# Patient Record
Sex: FEMALE | Race: WHITE | Employment: OTHER | URBAN - METROPOLITAN AREA
[De-identification: names, ages, dates, MRNs, and addresses within clinical notes are randomized per-mention and may not be internally consistent; named-entity substitution may affect disease eponyms.]

---

## 2023-07-10 ENCOUNTER — OFFICE VISIT (OUTPATIENT)
Dept: PRIMARY CARE | Facility: CLINIC | Age: 63
End: 2023-07-10

## 2023-07-10 VITALS
HEIGHT: 64 IN | OXYGEN SATURATION: 97 % | DIASTOLIC BLOOD PRESSURE: 84 MMHG | WEIGHT: 275 LBS | TEMPERATURE: 97.4 F | HEART RATE: 80 BPM | SYSTOLIC BLOOD PRESSURE: 136 MMHG | BODY MASS INDEX: 46.95 KG/M2

## 2023-07-10 DIAGNOSIS — E78.5 HYPERLIPIDEMIA, UNSPECIFIED HYPERLIPIDEMIA TYPE: ICD-10-CM

## 2023-07-10 DIAGNOSIS — I10 BENIGN ESSENTIAL HYPERTENSION: ICD-10-CM

## 2023-07-10 DIAGNOSIS — E11.65 TYPE 2 DIABETES MELLITUS WITH HYPERGLYCEMIA, WITHOUT LONG-TERM CURRENT USE OF INSULIN (MULTI): Primary | ICD-10-CM

## 2023-07-10 DIAGNOSIS — F33.1 DEPRESSION, MAJOR, RECURRENT, MODERATE (MULTI): ICD-10-CM

## 2023-07-10 DIAGNOSIS — J30.9 ALLERGIC RHINITIS, UNSPECIFIED SEASONALITY, UNSPECIFIED TRIGGER: ICD-10-CM

## 2023-07-10 DIAGNOSIS — E66.01 MORBID OBESITY (MULTI): ICD-10-CM

## 2023-07-10 DIAGNOSIS — J45.20 MILD INTERMITTENT ASTHMA WITHOUT COMPLICATION (HHS-HCC): Chronic | ICD-10-CM

## 2023-07-10 DIAGNOSIS — E03.9 HYPOTHYROIDISM, UNSPECIFIED TYPE: ICD-10-CM

## 2023-07-10 PROBLEM — E55.9 VITAMIN D DEFICIENCY: Status: ACTIVE | Noted: 2023-01-20

## 2023-07-10 PROBLEM — E78.00 PURE HYPERCHOLESTEROLEMIA: Status: ACTIVE | Noted: 2022-01-04

## 2023-07-10 PROBLEM — E11.9 TYPE 2 DIABETES MELLITUS (MULTI): Chronic | Status: ACTIVE | Noted: 2022-01-04

## 2023-07-10 PROBLEM — E11.9 TYPE 2 DIABETES MELLITUS (MULTI): Status: ACTIVE | Noted: 2022-01-04

## 2023-07-10 PROCEDURE — 1036F TOBACCO NON-USER: CPT | Performed by: FAMILY MEDICINE

## 2023-07-10 PROCEDURE — 3079F DIAST BP 80-89 MM HG: CPT | Performed by: FAMILY MEDICINE

## 2023-07-10 PROCEDURE — 3044F HG A1C LEVEL LT 7.0%: CPT | Performed by: FAMILY MEDICINE

## 2023-07-10 PROCEDURE — 4010F ACE/ARB THERAPY RXD/TAKEN: CPT | Performed by: FAMILY MEDICINE

## 2023-07-10 PROCEDURE — 99214 OFFICE O/P EST MOD 30 MIN: CPT | Performed by: FAMILY MEDICINE

## 2023-07-10 PROCEDURE — 3075F SYST BP GE 130 - 139MM HG: CPT | Performed by: FAMILY MEDICINE

## 2023-07-10 RX ORDER — GLIPIZIDE 5 MG/1
5 TABLET ORAL 2 TIMES DAILY
COMMUNITY
Start: 2012-12-07 | End: 2023-07-10 | Stop reason: SDUPTHER

## 2023-07-10 RX ORDER — LISINOPRIL 40 MG/1
40 TABLET ORAL DAILY
COMMUNITY
End: 2023-07-10 | Stop reason: SDUPTHER

## 2023-07-10 RX ORDER — LISINOPRIL 40 MG/1
40 TABLET ORAL DAILY
Qty: 90 TABLET | Refills: 1 | Status: SHIPPED | OUTPATIENT
Start: 2023-07-10 | End: 2024-02-19 | Stop reason: SDUPTHER

## 2023-07-10 RX ORDER — BUPROPION HYDROCHLORIDE 150 MG/1
150 TABLET ORAL DAILY
Qty: 90 TABLET | Refills: 1 | Status: SHIPPED | OUTPATIENT
Start: 2023-07-10 | End: 2024-02-19 | Stop reason: SDUPTHER

## 2023-07-10 RX ORDER — LEVOTHYROXINE SODIUM 112 UG/1
112 TABLET ORAL DAILY
Qty: 90 TABLET | Refills: 1 | Status: SHIPPED | OUTPATIENT
Start: 2023-07-10 | End: 2024-02-19 | Stop reason: SDUPTHER

## 2023-07-10 RX ORDER — LEVOTHYROXINE SODIUM 112 UG/1
112 TABLET ORAL DAILY
COMMUNITY
End: 2023-07-10 | Stop reason: SDUPTHER

## 2023-07-10 RX ORDER — METFORMIN HYDROCHLORIDE 1000 MG/1
1000 TABLET ORAL 2 TIMES DAILY
Qty: 180 TABLET | Refills: 1 | Status: SHIPPED | OUTPATIENT
Start: 2023-07-10 | End: 2024-02-19 | Stop reason: SDUPTHER

## 2023-07-10 RX ORDER — ATORVASTATIN CALCIUM 20 MG/1
20 TABLET, FILM COATED ORAL DAILY
COMMUNITY
End: 2023-07-10 | Stop reason: SDUPTHER

## 2023-07-10 RX ORDER — ACETAMINOPHEN, DEXTROMETHORPHAN HBR, DOXYLAMINE SUCCINATE, PHENYLEPHRINE HCL 650; 20; 12.5; 1 MG/30ML; MG/30ML; MG/30ML; MG/30ML
1 SOLUTION ORAL DAILY
COMMUNITY
Start: 2022-09-02

## 2023-07-10 RX ORDER — ATORVASTATIN CALCIUM 20 MG/1
20 TABLET, FILM COATED ORAL DAILY
Qty: 90 TABLET | Refills: 1 | Status: SHIPPED | OUTPATIENT
Start: 2023-07-10 | End: 2024-02-19 | Stop reason: SDUPTHER

## 2023-07-10 RX ORDER — DULAGLUTIDE 1.5 MG/.5ML
1.5 INJECTION, SOLUTION SUBCUTANEOUS
COMMUNITY
Start: 2022-03-28 | End: 2023-07-10

## 2023-07-10 RX ORDER — ALBUTEROL SULFATE 90 UG/1
2 POWDER, METERED RESPIRATORY (INHALATION) DAILY PRN
COMMUNITY
Start: 2023-01-25 | End: 2024-05-21 | Stop reason: SDUPTHER

## 2023-07-10 RX ORDER — FUROSEMIDE 20 MG/1
20 TABLET ORAL DAILY PRN
COMMUNITY

## 2023-07-10 RX ORDER — CETIRIZINE HYDROCHLORIDE 10 MG/1
10 TABLET ORAL DAILY
COMMUNITY

## 2023-07-10 RX ORDER — GLIPIZIDE 5 MG/1
5 TABLET ORAL 2 TIMES DAILY
Qty: 180 TABLET | Refills: 1 | Status: SHIPPED | OUTPATIENT
Start: 2023-07-10 | End: 2024-02-19 | Stop reason: SDUPTHER

## 2023-07-10 RX ORDER — METFORMIN HYDROCHLORIDE 1000 MG/1
1000 TABLET ORAL 2 TIMES DAILY
COMMUNITY
Start: 2012-11-21 | End: 2023-07-10 | Stop reason: SDUPTHER

## 2023-07-10 RX ORDER — BUPROPION HYDROCHLORIDE 150 MG/1
150 TABLET ORAL DAILY
COMMUNITY
End: 2023-07-10 | Stop reason: SDUPTHER

## 2023-07-10 ASSESSMENT — PATIENT HEALTH QUESTIONNAIRE - PHQ9
2. FEELING DOWN, DEPRESSED OR HOPELESS: NOT AT ALL
SUM OF ALL RESPONSES TO PHQ9 QUESTIONS 1 AND 2: 0
1. LITTLE INTEREST OR PLEASURE IN DOING THINGS: NOT AT ALL

## 2023-07-10 ASSESSMENT — ENCOUNTER SYMPTOMS
SHORTNESS OF BREATH: 0
FEVER: 0
NAUSEA: 0
VOMITING: 0
COUGH: 0
ABDOMINAL PAIN: 0
CHILLS: 0
DIARRHEA: 0
DYSURIA: 0

## 2023-07-10 NOTE — PROGRESS NOTES
"Subjective   Patient ID: Dinah Canales is a 63 y.o. female who presents for Diabetes, Hyperlipidemia, Hypertension, and Hypothyroidism.    Dinah presents for follow-up of chronic conditions.  She has been feeling more stressed recently.  Feels that the bupropion is working well most days.    Is off Trulicity due to insurance issues.  Recently went on a cruise, stenosed her blood sugars are probably higher than usual.  Did have lab work done.  Is here to review.         Review of Systems   Constitutional:  Negative for chills and fever.   Respiratory:  Negative for cough and shortness of breath.    Cardiovascular:  Negative for chest pain.   Gastrointestinal:  Negative for abdominal pain, diarrhea, nausea and vomiting.   Genitourinary:  Negative for dysuria.       Objective   /84   Pulse 80   Temp 36.3 °C (97.4 °F)   Ht 1.613 m (5' 3.5\")   Wt 125 kg (275 lb)   SpO2 97%   BMI 47.95 kg/m²     Physical Exam  Constitutional:       General: She is not in acute distress.     Appearance: Normal appearance.   HENT:      Head: Normocephalic.      Mouth/Throat:      Mouth: Mucous membranes are moist.   Eyes:      Extraocular Movements: Extraocular movements intact.      Conjunctiva/sclera: Conjunctivae normal.   Cardiovascular:      Rate and Rhythm: Normal rate and regular rhythm.      Heart sounds: No murmur heard.  Pulmonary:      Breath sounds: No wheezing or rhonchi.   Musculoskeletal:      Cervical back: Neck supple.   Skin:     General: Skin is warm and dry.   Neurological:      Mental Status: She is alert.   Psychiatric:         Mood and Affect: Mood normal.         Behavior: Behavior normal.         Thought Content: Thought content normal.         Judgment: Judgment normal.         Assessment/Plan   Problem List Items Addressed This Visit       Allergic rhinitis     Continue OTC allergy medication.         Asthma (Chronic)     Continue albuterol as needed.         Benign essential hypertension (Chronic)     " Controlled.  Continue lisinopril 40 mg.         Relevant Medications    lisinopril 40 mg tablet    Other Relevant Orders    Comprehensive Metabolic Panel    Hyperlipidemia (Chronic)     Recheck lipid panel with next labs.  Continue atorvastatin 20 mg.         Relevant Medications    atorvastatin (Lipitor) 20 mg tablet    Other Relevant Orders    Lipid Panel    Comprehensive Metabolic Panel    Hypothyroidism (Chronic)     TSH at goal.  Continue levothyroxine 112 mcg.         Relevant Medications    levothyroxine (Synthroid, Levoxyl) 112 mcg tablet    Other Relevant Orders    TSH with reflex to Free T4 if abnormal    Morbid obesity (CMS/[facility])     Continue to work on lifestyle modification.         Type 2 diabetes mellitus (CMS/HCC) - Primary (Chronic)     Hemoglobin A1c increased from 6.6% to 7.1% while off Trulicity.  Plan to restart Trulicity once the patient is on Medicare in January.  Continue metformin and glipizide at this time.         Relevant Medications    glipiZIDE (Glucotrol) 5 mg tablet    metFORMIN (Glucophage) 1,000 mg tablet    Other Relevant Orders    Hemoglobin A1C     Other Visit Diagnoses       Depression, major, recurrent, moderate (CMS/HCC)        Relevant Medications    buPROPion XL (Wellbutrin XL) 150 mg 24 hr tablet

## 2023-07-10 NOTE — ASSESSMENT & PLAN NOTE
Hemoglobin A1c increased from 6.6% to 7.1% while off Trulicity.  Plan to restart Trulicity once the patient is on Medicare in January.  Continue metformin and glipizide at this time.

## 2024-02-16 ENCOUNTER — LAB (OUTPATIENT)
Dept: LAB | Facility: LAB | Age: 64
End: 2024-02-16
Payer: MEDICARE

## 2024-02-16 DIAGNOSIS — E78.5 HYPERLIPIDEMIA, UNSPECIFIED HYPERLIPIDEMIA TYPE: ICD-10-CM

## 2024-02-16 DIAGNOSIS — E03.9 HYPOTHYROIDISM, UNSPECIFIED TYPE: ICD-10-CM

## 2024-02-16 DIAGNOSIS — E11.65 TYPE 2 DIABETES MELLITUS WITH HYPERGLYCEMIA, WITHOUT LONG-TERM CURRENT USE OF INSULIN (MULTI): ICD-10-CM

## 2024-02-16 DIAGNOSIS — I10 BENIGN ESSENTIAL HYPERTENSION: ICD-10-CM

## 2024-02-16 LAB
ALBUMIN SERPL BCP-MCNC: 3.7 G/DL (ref 3.4–5)
ALP SERPL-CCNC: 69 U/L (ref 33–136)
ALT SERPL W P-5'-P-CCNC: 23 U/L (ref 7–45)
ANION GAP SERPL CALC-SCNC: 12 MMOL/L (ref 10–20)
AST SERPL W P-5'-P-CCNC: 19 U/L (ref 9–39)
BILIRUB SERPL-MCNC: 0.3 MG/DL (ref 0–1.2)
BUN SERPL-MCNC: 14 MG/DL (ref 6–23)
CALCIUM SERPL-MCNC: 9.2 MG/DL (ref 8.6–10.3)
CHLORIDE SERPL-SCNC: 104 MMOL/L (ref 98–107)
CHOLEST SERPL-MCNC: 157 MG/DL (ref 0–199)
CHOLESTEROL/HDL RATIO: 3.7
CO2 SERPL-SCNC: 29 MMOL/L (ref 21–32)
CREAT SERPL-MCNC: 0.81 MG/DL (ref 0.5–1.05)
EGFRCR SERPLBLD CKD-EPI 2021: 81 ML/MIN/1.73M*2
EST. AVERAGE GLUCOSE BLD GHB EST-MCNC: 154 MG/DL
GLUCOSE SERPL-MCNC: 93 MG/DL (ref 74–99)
HBA1C MFR BLD: 7 %
HDLC SERPL-MCNC: 42.2 MG/DL
LDLC SERPL CALC-MCNC: 78 MG/DL
NON HDL CHOLESTEROL: 115 MG/DL (ref 0–149)
POTASSIUM SERPL-SCNC: 4.6 MMOL/L (ref 3.5–5.3)
PROT SERPL-MCNC: 6.8 G/DL (ref 6.4–8.2)
SODIUM SERPL-SCNC: 140 MMOL/L (ref 136–145)
T4 FREE SERPL-MCNC: 0.91 NG/DL (ref 0.61–1.12)
TRIGL SERPL-MCNC: 185 MG/DL (ref 0–149)
TSH SERPL-ACNC: 4.03 MIU/L (ref 0.44–3.98)
VLDL: 37 MG/DL (ref 0–40)

## 2024-02-16 PROCEDURE — 84439 ASSAY OF FREE THYROXINE: CPT

## 2024-02-16 PROCEDURE — 80053 COMPREHEN METABOLIC PANEL: CPT

## 2024-02-16 PROCEDURE — 83036 HEMOGLOBIN GLYCOSYLATED A1C: CPT

## 2024-02-16 PROCEDURE — 84443 ASSAY THYROID STIM HORMONE: CPT

## 2024-02-16 PROCEDURE — 80061 LIPID PANEL: CPT

## 2024-02-16 PROCEDURE — 36415 COLL VENOUS BLD VENIPUNCTURE: CPT

## 2024-02-19 ENCOUNTER — OFFICE VISIT (OUTPATIENT)
Dept: PRIMARY CARE | Facility: CLINIC | Age: 64
End: 2024-02-19
Payer: MEDICARE

## 2024-02-19 VITALS
WEIGHT: 274 LBS | OXYGEN SATURATION: 96 % | BODY MASS INDEX: 47.78 KG/M2 | HEART RATE: 70 BPM | TEMPERATURE: 97.3 F | SYSTOLIC BLOOD PRESSURE: 136 MMHG | DIASTOLIC BLOOD PRESSURE: 82 MMHG

## 2024-02-19 DIAGNOSIS — E03.9 HYPOTHYROIDISM, UNSPECIFIED TYPE: ICD-10-CM

## 2024-02-19 DIAGNOSIS — E11.65 TYPE 2 DIABETES MELLITUS WITH HYPERGLYCEMIA, WITHOUT LONG-TERM CURRENT USE OF INSULIN (MULTI): ICD-10-CM

## 2024-02-19 DIAGNOSIS — J30.9 ALLERGIC RHINITIS, UNSPECIFIED SEASONALITY, UNSPECIFIED TRIGGER: ICD-10-CM

## 2024-02-19 DIAGNOSIS — I10 BENIGN ESSENTIAL HYPERTENSION: ICD-10-CM

## 2024-02-19 DIAGNOSIS — S39.012A LOW BACK STRAIN, INITIAL ENCOUNTER: ICD-10-CM

## 2024-02-19 DIAGNOSIS — J32.9 RECURRENT SINUS INFECTIONS: ICD-10-CM

## 2024-02-19 DIAGNOSIS — Z12.11 SCREEN FOR COLON CANCER: ICD-10-CM

## 2024-02-19 DIAGNOSIS — F33.1 DEPRESSION, MAJOR, RECURRENT, MODERATE (MULTI): ICD-10-CM

## 2024-02-19 DIAGNOSIS — Z23 NEED FOR PNEUMOCOCCAL VACCINE: ICD-10-CM

## 2024-02-19 DIAGNOSIS — E78.5 HYPERLIPIDEMIA, UNSPECIFIED HYPERLIPIDEMIA TYPE: ICD-10-CM

## 2024-02-19 DIAGNOSIS — Z11.59 NEED FOR HEPATITIS C SCREENING TEST: ICD-10-CM

## 2024-02-19 DIAGNOSIS — E66.01 MORBID OBESITY (MULTI): ICD-10-CM

## 2024-02-19 DIAGNOSIS — J45.20 MILD INTERMITTENT ASTHMA WITHOUT COMPLICATION (HHS-HCC): Chronic | ICD-10-CM

## 2024-02-19 DIAGNOSIS — Z11.4 SCREENING FOR HUMAN IMMUNODEFICIENCY VIRUS: ICD-10-CM

## 2024-02-19 DIAGNOSIS — E55.9 VITAMIN D DEFICIENCY: ICD-10-CM

## 2024-02-19 DIAGNOSIS — Z12.31 ENCOUNTER FOR SCREENING MAMMOGRAM FOR MALIGNANT NEOPLASM OF BREAST: Primary | ICD-10-CM

## 2024-02-19 PROCEDURE — 3075F SYST BP GE 130 - 139MM HG: CPT | Performed by: FAMILY MEDICINE

## 2024-02-19 PROCEDURE — 3079F DIAST BP 80-89 MM HG: CPT | Performed by: FAMILY MEDICINE

## 2024-02-19 PROCEDURE — 99214 OFFICE O/P EST MOD 30 MIN: CPT | Performed by: FAMILY MEDICINE

## 2024-02-19 PROCEDURE — 3048F LDL-C <100 MG/DL: CPT | Performed by: FAMILY MEDICINE

## 2024-02-19 PROCEDURE — 3051F HG A1C>EQUAL 7.0%<8.0%: CPT | Performed by: FAMILY MEDICINE

## 2024-02-19 PROCEDURE — G0009 ADMIN PNEUMOCOCCAL VACCINE: HCPCS | Performed by: FAMILY MEDICINE

## 2024-02-19 PROCEDURE — 1036F TOBACCO NON-USER: CPT | Performed by: FAMILY MEDICINE

## 2024-02-19 PROCEDURE — 90677 PCV20 VACCINE IM: CPT | Performed by: FAMILY MEDICINE

## 2024-02-19 PROCEDURE — 4010F ACE/ARB THERAPY RXD/TAKEN: CPT | Performed by: FAMILY MEDICINE

## 2024-02-19 RX ORDER — METFORMIN HYDROCHLORIDE 1000 MG/1
1000 TABLET ORAL 2 TIMES DAILY
Qty: 180 TABLET | Refills: 1 | Status: SHIPPED | OUTPATIENT
Start: 2024-02-19 | End: 2024-05-21 | Stop reason: SDUPTHER

## 2024-02-19 RX ORDER — GLIPIZIDE 5 MG/1
5 TABLET ORAL 2 TIMES DAILY
Qty: 180 TABLET | Refills: 1 | Status: SHIPPED | OUTPATIENT
Start: 2024-02-19 | End: 2024-05-21 | Stop reason: ALTCHOICE

## 2024-02-19 RX ORDER — BUPROPION HYDROCHLORIDE 150 MG/1
150 TABLET ORAL DAILY
Qty: 90 TABLET | Refills: 1 | Status: SHIPPED | OUTPATIENT
Start: 2024-02-19 | End: 2024-05-21 | Stop reason: SDUPTHER

## 2024-02-19 RX ORDER — LEVOTHYROXINE SODIUM 112 UG/1
TABLET ORAL
Qty: 120 TABLET | Refills: 1 | Status: SHIPPED | OUTPATIENT
Start: 2024-02-19 | End: 2024-05-21 | Stop reason: SDUPTHER

## 2024-02-19 RX ORDER — DOXYCYCLINE 100 MG/1
100 CAPSULE ORAL 2 TIMES DAILY
Qty: 20 CAPSULE | Refills: 0 | Status: SHIPPED | OUTPATIENT
Start: 2024-02-19 | End: 2024-02-29

## 2024-02-19 RX ORDER — DULAGLUTIDE 0.75 MG/.5ML
0.75 INJECTION, SOLUTION SUBCUTANEOUS
Qty: 6 ML | Refills: 1 | Status: SHIPPED | OUTPATIENT
Start: 2024-02-19 | End: 2024-05-01

## 2024-02-19 RX ORDER — CYCLOBENZAPRINE HCL 5 MG
5 TABLET ORAL NIGHTLY PRN
Qty: 10 TABLET | Refills: 0 | Status: SHIPPED | OUTPATIENT
Start: 2024-02-19 | End: 2024-04-19

## 2024-02-19 RX ORDER — LISINOPRIL 40 MG/1
40 TABLET ORAL DAILY
Qty: 90 TABLET | Refills: 1 | Status: SHIPPED | OUTPATIENT
Start: 2024-02-19 | End: 2024-05-21 | Stop reason: SDUPTHER

## 2024-02-19 RX ORDER — ATORVASTATIN CALCIUM 20 MG/1
20 TABLET, FILM COATED ORAL DAILY
Qty: 90 TABLET | Refills: 1 | Status: SHIPPED | OUTPATIENT
Start: 2024-02-19 | End: 2024-05-21 | Stop reason: SDUPTHER

## 2024-02-19 ASSESSMENT — ENCOUNTER SYMPTOMS
DIARRHEA: 0
SHORTNESS OF BREATH: 0
COUGH: 0
VOMITING: 0
NAUSEA: 0
ABDOMINAL PAIN: 0

## 2024-02-19 NOTE — PROGRESS NOTES
Subjective   Patient ID: Dinah Canales is a 63 y.o. female who presents for Hyperlipidemia, Hypertension, Hypothyroidism (Recheck /Review bw), and Diabetes.    Dinah presents for follow-up. She has been feeling well. Working on lifestyle changes. Having trouble losing weight. Would like to get started on Trulicity again.     Having pressure in sinuses. Has been present for 1-2 months.    Low back hurting after hanging wallpaper. Feels like muscle pulled in low back on both sides. Using heat and massage; having some improvement.          Review of Systems   Respiratory:  Negative for cough and shortness of breath.    Cardiovascular:  Negative for chest pain.   Gastrointestinal:  Negative for abdominal pain, diarrhea, nausea and vomiting.       Objective   /82   Pulse 70   Temp 36.3 °C (97.3 °F)   Wt 124 kg (274 lb)   SpO2 96%   BMI 47.78 kg/m²     Physical Exam  Constitutional:       General: She is not in acute distress.     Appearance: Normal appearance.   HENT:      Head: Normocephalic.      Mouth/Throat:      Mouth: Mucous membranes are moist.   Eyes:      Extraocular Movements: Extraocular movements intact.      Conjunctiva/sclera: Conjunctivae normal.   Cardiovascular:      Rate and Rhythm: Normal rate and regular rhythm.      Heart sounds: No murmur heard.  Pulmonary:      Breath sounds: No wheezing or rhonchi.   Musculoskeletal:      Cervical back: Neck supple.      Lumbar back: Tenderness present. No edema or bony tenderness. Normal range of motion.   Skin:     General: Skin is warm and dry.   Neurological:      Mental Status: She is alert.   Psychiatric:         Mood and Affect: Mood normal.         Behavior: Behavior normal.         Assessment/Plan   Problem List Items Addressed This Visit             ICD-10-CM    Allergic rhinitis J30.9    Asthma (Chronic) J45.909     Continue albuterol as needed.          Benign essential hypertension (Chronic) I10    Relevant Medications    lisinopril 40 mg  tablet    Other Relevant Orders    Comprehensive Metabolic Panel    Hyperlipidemia (Chronic) E78.5     LDL at goal.          Relevant Medications    atorvastatin (Lipitor) 20 mg tablet    Other Relevant Orders    Lipid Panel    Hypothyroidism (Chronic) E03.9     TSH mildly elevated. Increase to 2 tabs of levothyroxine on Sundays. Recheck TSH in 3 months.          Relevant Medications    levothyroxine (Synthroid, Levoxyl) 112 mcg tablet    Other Relevant Orders    TSH with reflex to Free T4 if abnormal    Morbid obesity (CMS/HCC) E66.01     Continue lifestyle modification.          Type 2 diabetes mellitus (CMS/HCC) (Chronic) E11.9     HgbA1c 7.0%. Start Trulicity.          Relevant Medications    glipiZIDE (Glucotrol) 5 mg tablet    metFORMIN (Glucophage) 1,000 mg tablet    dulaglutide (Trulicity) 0.75 mg/0.5 mL pen injector    Other Relevant Orders    Hemoglobin A1C     Other Visit Diagnoses         Codes    Encounter for screening mammogram for malignant neoplasm of breast    -  Primary Z12.31    Relevant Orders    BI mammo bilateral screening tomosynthesis    Depression, major, recurrent, moderate (CMS/HCC)     F33.1    Relevant Medications    buPROPion XL (Wellbutrin XL) 150 mg 24 hr tablet    Recurrent sinus infections     J32.9    Relevant Medications    doxycycline (Vibramycin) 100 mg capsule    Low back strain, initial encounter     S39.012A    Relevant Medications    cyclobenzaprine (Flexeril) 5 mg tablet    Screen for colon cancer     Z12.11    Relevant Orders    Colonoscopy Screening; Average Risk Patient    Need for hepatitis C screening test     Z11.59    Relevant Orders    Hepatitis C Antibody    Screening for human immunodeficiency virus     Z11.4    Relevant Orders    HIV 1/2 Antigen/Antibody Screen with Reflex to Confirmation    Vitamin D deficiency     E55.9    Relevant Orders    Vitamin D 25-Hydroxy,Total (for eval of Vitamin D levels)    Need for pneumococcal vaccine     Z23    Relevant Orders     Pneumococcal conjugate vaccine, 20-valent (PREVNAR 20) (Completed)

## 2024-03-04 ENCOUNTER — HOSPITAL ENCOUNTER (OUTPATIENT)
Dept: RADIOLOGY | Facility: CLINIC | Age: 64
Discharge: HOME | End: 2024-03-04
Payer: MEDICARE

## 2024-03-04 DIAGNOSIS — Z12.31 ENCOUNTER FOR SCREENING MAMMOGRAM FOR MALIGNANT NEOPLASM OF BREAST: ICD-10-CM

## 2024-03-04 PROCEDURE — 77063 BREAST TOMOSYNTHESIS BI: CPT | Performed by: RADIOLOGY

## 2024-03-04 PROCEDURE — 77063 BREAST TOMOSYNTHESIS BI: CPT

## 2024-03-04 PROCEDURE — 77067 SCR MAMMO BI INCL CAD: CPT | Performed by: RADIOLOGY

## 2024-04-23 ENCOUNTER — HOSPITAL ENCOUNTER (OUTPATIENT)
Dept: CARDIOLOGY | Facility: HOSPITAL | Age: 64
Discharge: HOME | End: 2024-04-23
Payer: MEDICARE

## 2024-04-23 ENCOUNTER — PRE-ADMISSION TESTING (OUTPATIENT)
Dept: PREADMISSION TESTING | Facility: HOSPITAL | Age: 64
End: 2024-04-23
Payer: MEDICARE

## 2024-04-23 ENCOUNTER — ANESTHESIA EVENT (OUTPATIENT)
Dept: OPERATING ROOM | Facility: HOSPITAL | Age: 64
End: 2024-04-23
Payer: MEDICARE

## 2024-04-23 VITALS
HEART RATE: 60 BPM | OXYGEN SATURATION: 92 % | RESPIRATION RATE: 22 BRPM | SYSTOLIC BLOOD PRESSURE: 126 MMHG | WEIGHT: 263 LBS | DIASTOLIC BLOOD PRESSURE: 82 MMHG | TEMPERATURE: 98.8 F | HEIGHT: 63 IN | BODY MASS INDEX: 46.6 KG/M2

## 2024-04-23 DIAGNOSIS — I10 BENIGN ESSENTIAL HYPERTENSION: Chronic | ICD-10-CM

## 2024-04-23 DIAGNOSIS — Z01.818 PRE-OP EVALUATION: ICD-10-CM

## 2024-04-23 DIAGNOSIS — E11.65 TYPE 2 DIABETES MELLITUS WITH HYPERGLYCEMIA, WITHOUT LONG-TERM CURRENT USE OF INSULIN (MULTI): Chronic | ICD-10-CM

## 2024-04-23 DIAGNOSIS — Z12.11 SCREEN FOR COLON CANCER: ICD-10-CM

## 2024-04-23 DIAGNOSIS — E66.01 MORBID OBESITY (MULTI): ICD-10-CM

## 2024-04-23 DIAGNOSIS — Z01.818 PRE-OP EVALUATION: Primary | ICD-10-CM

## 2024-04-23 DIAGNOSIS — J45.20 MILD INTERMITTENT ASTHMA WITHOUT COMPLICATION (HHS-HCC): Chronic | ICD-10-CM

## 2024-04-23 DIAGNOSIS — E78.5 HYPERLIPIDEMIA, UNSPECIFIED HYPERLIPIDEMIA TYPE: Chronic | ICD-10-CM

## 2024-04-23 LAB
ANION GAP SERPL CALC-SCNC: 14 MMOL/L (ref 10–20)
ATRIAL RATE: 57 BPM
BUN SERPL-MCNC: 20 MG/DL (ref 6–23)
CALCIUM SERPL-MCNC: 9.2 MG/DL (ref 8.6–10.3)
CHLORIDE SERPL-SCNC: 107 MMOL/L (ref 98–107)
CO2 SERPL-SCNC: 24 MMOL/L (ref 21–32)
CREAT SERPL-MCNC: 0.86 MG/DL (ref 0.5–1.05)
EGFRCR SERPLBLD CKD-EPI 2021: 75 ML/MIN/1.73M*2
ERYTHROCYTE [DISTWIDTH] IN BLOOD BY AUTOMATED COUNT: 14.2 % (ref 11.5–14.5)
GLUCOSE SERPL-MCNC: 76 MG/DL (ref 74–99)
HCT VFR BLD AUTO: 42.4 % (ref 36–46)
HGB BLD-MCNC: 13.3 G/DL (ref 12–16)
MCH RBC QN AUTO: 28.2 PG (ref 26–34)
MCHC RBC AUTO-ENTMCNC: 31.4 G/DL (ref 32–36)
MCV RBC AUTO: 90 FL (ref 80–100)
NRBC BLD-RTO: 0 /100 WBCS (ref 0–0)
P AXIS: 48 DEGREES
PLATELET # BLD AUTO: 261 X10*3/UL (ref 150–450)
POTASSIUM SERPL-SCNC: 5 MMOL/L (ref 3.5–5.3)
PR INTERVAL: 148 MS
Q ONSET: 253 MS
QRS COUNT: 9 BEATS
QRS DURATION: 98 MS
QT INTERVAL: 400 MS
QTC CALCULATION(BAZETT): 387 MS
QTC FREDERICIA: 391 MS
R AXIS: 58 DEGREES
RBC # BLD AUTO: 4.72 X10*6/UL (ref 4–5.2)
SODIUM SERPL-SCNC: 140 MMOL/L (ref 136–145)
T AXIS: -2 DEGREES
T OFFSET: 453 MS
VENTRICULAR RATE: 56 BPM
WBC # BLD AUTO: 9 X10*3/UL (ref 4.4–11.3)

## 2024-04-23 PROCEDURE — 93005 ELECTROCARDIOGRAM TRACING: CPT

## 2024-04-23 PROCEDURE — 99203 OFFICE O/P NEW LOW 30 MIN: CPT | Performed by: CLINICAL NURSE SPECIALIST

## 2024-04-23 PROCEDURE — 85027 COMPLETE CBC AUTOMATED: CPT

## 2024-04-23 PROCEDURE — 82374 ASSAY BLOOD CARBON DIOXIDE: CPT

## 2024-04-23 PROCEDURE — 36415 COLL VENOUS BLD VENIPUNCTURE: CPT

## 2024-04-23 PROCEDURE — 93010 ELECTROCARDIOGRAM REPORT: CPT | Performed by: STUDENT IN AN ORGANIZED HEALTH CARE EDUCATION/TRAINING PROGRAM

## 2024-04-23 RX ORDER — FAMOTIDINE 10 MG/ML
20 INJECTION INTRAVENOUS ONCE
Status: CANCELLED | OUTPATIENT
Start: 2024-04-23 | End: 2024-04-23

## 2024-04-23 RX ORDER — SODIUM CHLORIDE, SODIUM LACTATE, POTASSIUM CHLORIDE, CALCIUM CHLORIDE 600; 310; 30; 20 MG/100ML; MG/100ML; MG/100ML; MG/100ML
100 INJECTION, SOLUTION INTRAVENOUS CONTINUOUS
Status: CANCELLED | OUTPATIENT
Start: 2024-04-23

## 2024-04-23 RX ORDER — OXYCODONE HYDROCHLORIDE 5 MG/1
5 TABLET ORAL EVERY 4 HOURS PRN
Status: CANCELLED | OUTPATIENT
Start: 2024-04-23

## 2024-04-23 RX ORDER — MORPHINE SULFATE 2 MG/ML
1 INJECTION, SOLUTION INTRAMUSCULAR; INTRAVENOUS EVERY 5 MIN PRN
Status: CANCELLED | OUTPATIENT
Start: 2024-04-23

## 2024-04-23 RX ORDER — ONDANSETRON HYDROCHLORIDE 2 MG/ML
4 INJECTION, SOLUTION INTRAVENOUS ONCE AS NEEDED
Status: CANCELLED | OUTPATIENT
Start: 2024-04-23

## 2024-04-23 RX ORDER — LIDOCAINE HYDROCHLORIDE 10 MG/ML
0.1 INJECTION, SOLUTION EPIDURAL; INFILTRATION; INTRACAUDAL; PERINEURAL ONCE
Status: CANCELLED | OUTPATIENT
Start: 2024-04-23 | End: 2024-04-23

## 2024-04-23 ASSESSMENT — LIFESTYLE VARIABLES: SMOKING_STATUS: NONSMOKER

## 2024-04-23 ASSESSMENT — CHADS2 SCORE
CHADS2 SCORE: 2
HYPERTENSION: YES
AGE GREATER THAN OR EQUAL TO 75: NO
CHF: NO
PRIOR STROKE OR TIA OR THROMBOEMBOLISM: NO
DIABETES: YES

## 2024-04-23 NOTE — CPM/PAT H&P
CPM/PAT Evaluation       Name: Dinah Canales (Dinah Canales)  /Age: 1960/64 y.o.     In-Person       Chief Complaint: Scheduled for colonoscopy under MAC anesthesia per Dr. Briones on 24.       Past Medical History:   Diagnosis Date    Glaucoma     marleen stents       Past Surgical History:   Procedure Laterality Date    CATARACT EXTRACTION      HEMORRHOID SURGERY      KNEE SURGERY         Patient  has no history on file for sexual activity.    Family History   Problem Relation Name Age of Onset    Ovarian cancer Mother      Other (mouth cancer) Father      Lupus Sister      Alcohol abuse Other         Allergies   Allergen Reactions    Penicillins Other, Hives and Palpitations     Anaphylactic reaction    Ciprofloxacin Other     Severe  shoulder pain       Prior to Admission medications    Medication Sig Start Date End Date Taking? Authorizing Provider   albuterol (ProAir RespiClick) 90 mcg/actuation aerosol powdr breath activated inhaler Inhale 2 puffs once daily as needed. 23   Historical Provider, MD   atorvastatin (Lipitor) 20 mg tablet Take 1 tablet (20 mg) by mouth once daily. 24   Cecelia La,    buPROPion XL (Wellbutrin XL) 150 mg 24 hr tablet Take 1 tablet (150 mg) by mouth once daily. 24   Cecelia La,    cetirizine (ZyrTEC) 10 mg tablet Take 1 tablet (10 mg) by mouth once daily.    Historical Provider, MD   cyanocobalamin, vitamin B-12, (Vitamin B-12) 1,000 mcg tablet extended release Take 1,000 mcg by mouth once daily. 22   Historical Provider, MD   cyclobenzaprine (Flexeril) 5 mg tablet Take 1 tablet (5 mg) by mouth as needed at bedtime for muscle spasms. 24  Cecelia La DO   dulaglutide (Trulicity) 0.75 mg/0.5 mL pen injector Inject 0.75 mg under the skin 1 (one) time per week.  Patient taking differently: Inject 0.75 mg under the skin 1 (one) time per week. 24   Cecelia La DO   furosemide (Lasix) 20 mg  tablet Take 1 tablet (20 mg) by mouth once daily as needed. FOR LEG SWELLING    Historical Provider, MD   glipiZIDE (Glucotrol) 5 mg tablet Take 1 tablet (5 mg) by mouth 2 times a day. 2/19/24   Cecelia La DO   levothyroxine (Synthroid, Levoxyl) 112 mcg tablet Take 1 tab PO daily except 2 tabs every Sunday.  Patient taking differently: Take 1 tab PO daily except 2 tabs every Saturday 2/19/24   Cecelia La DO   lisinopril 40 mg tablet Take 1 tablet (40 mg) by mouth once daily. 2/19/24   Cecelia La DO   metFORMIN (Glucophage) 1,000 mg tablet Take 1 tablet (1,000 mg) by mouth 2 times a day. 2/19/24   Cecelia La DO            Visit Vitals  /82   Pulse 60   Temp 37.1 °C (98.8 °F) (Oral)   Resp 22       DASI Risk Score    No data to display       Caprini DVT Assessment      Flowsheet Row Most Recent Value   DVT Score 6   Current Status Minor surgery planned   Age 60-75 years   BMI 41-50 (Morbid obesity)          Modified Frailty Index    No data to display       CHADS2 Stroke Risk  Current as of 17 minutes ago        N/A 3 to 100%: High Risk   2 to < 3%: Medium Risk   0 to < 2%: Low Risk     Last Change: N/A          This score determines the patient's risk of having a stroke if the patient has atrial fibrillation.        This score is not applicable to this patient. Components are not calculated.          Revised Cardiac Risk Index      Flowsheet Row Most Recent Value   Revised Cardiac Risk Calculator 0          Apfel Simplified Score      Flowsheet Row Most Recent Value   Apfel Simplified Score Calculator 2          Risk Analysis Index Results This Encounter    No data found in the last 1 encounters.       Stop Bang Score      Flowsheet Row Most Recent Value   Do you snore loudly? 0   Do you often feel tired or fatigued after your sleep? 0   Has anyone ever observed you stop breathing in your sleep? 0   Do you have or are you being treated for high blood pressure? 1    Recent BMI (Calculated) 47.9   Is BMI greater than 35 kg/m2? 1=Yes   Age older than 50 years old? 1=Yes   Is your neck circumference greater than 17 inches (Male) or 16 inches (Female)? 1   Gender - Male 0=No   STOP-BANG Total Score 4            Assessment and Plan:     Gastrointestinal:  Scheduled for colonoscopy under MAC anesthesia per Dr. Briones on 5/2/24.   CBC, BMP completed. Results pending.   EKG ordered, pending.  H&P dated 4/11/24 per Dr. Briones.  All colonoscopy instructions reviewed with patient by RN. Patient verbalized understanding.

## 2024-04-23 NOTE — PREPROCEDURE INSTRUCTIONS
Medication List            Accurate as of April 23, 2024 10:01 AM. Always use your most recent med list.                atorvastatin 20 mg tablet  Commonly known as: Lipitor  Take 1 tablet (20 mg) by mouth once daily.     buPROPion  mg 24 hr tablet  Commonly known as: Wellbutrin XL  Take 1 tablet (150 mg) by mouth once daily.     cetirizine 10 mg tablet  Commonly known as: ZyrTEC     cyanocobalamin (vitamin B-12) 1,000 mcg tablet extended release  Commonly known as: Vitamin B-12     furosemide 20 mg tablet  Commonly known as: Lasix     glipiZIDE 5 mg tablet  Commonly known as: Glucotrol  Take 1 tablet (5 mg) by mouth 2 times a day.     levothyroxine 112 mcg tablet  Commonly known as: Synthroid, Levoxyl  Take 1 tab PO daily except 2 tabs every Sunday.     lisinopril 40 mg tablet  Take 1 tablet (40 mg) by mouth once daily.     metFORMIN 1,000 mg tablet  Commonly known as: Glucophage  Take 1 tablet (1,000 mg) by mouth 2 times a day.     ProAir RespiClick 90 mcg/actuation aerosol powdr breath activated inhaler  Generic drug: albuterol     Trulicity 0.75 mg/0.5 mL pen injector  Generic drug: dulaglutide  Inject 0.75 mg under the skin 1 (one) time per week.                              NPO Instructions:    Last dose trulicity 4/20  Clear liquids with bowel prep on 5/1  No medications morning of   Do not take metformin night before procedure    Additional Instructions:     Wear  comfortable loose fitting clothing  Do not use moisturizers, creams, lotions or perfume  All jewelry and valuables should be left at home    Shower morning of deodorant only

## 2024-04-25 ENCOUNTER — PATIENT MESSAGE (OUTPATIENT)
Dept: PRIMARY CARE | Facility: CLINIC | Age: 64
End: 2024-04-25
Payer: MEDICARE

## 2024-04-25 DIAGNOSIS — E11.65 TYPE 2 DIABETES MELLITUS WITH HYPERGLYCEMIA, WITHOUT LONG-TERM CURRENT USE OF INSULIN (MULTI): Primary | Chronic | ICD-10-CM

## 2024-04-30 ENCOUNTER — OFFICE VISIT (OUTPATIENT)
Dept: PRIMARY CARE | Facility: CLINIC | Age: 64
End: 2024-04-30
Payer: MEDICARE

## 2024-04-30 VITALS
OXYGEN SATURATION: 97 % | SYSTOLIC BLOOD PRESSURE: 132 MMHG | TEMPERATURE: 97.7 F | DIASTOLIC BLOOD PRESSURE: 90 MMHG | HEART RATE: 61 BPM

## 2024-04-30 DIAGNOSIS — J01.90 ACUTE SINUSITIS, RECURRENCE NOT SPECIFIED, UNSPECIFIED LOCATION: Primary | ICD-10-CM

## 2024-04-30 PROBLEM — F33.1 MODERATE EPISODE OF RECURRENT MAJOR DEPRESSIVE DISORDER (MULTI): Status: ACTIVE | Noted: 2024-04-30

## 2024-04-30 PROBLEM — E55.9 VITAMIN D DEFICIENCY: Status: ACTIVE | Noted: 2024-04-30

## 2024-04-30 PROCEDURE — 99214 OFFICE O/P EST MOD 30 MIN: CPT | Performed by: PHYSICIAN ASSISTANT

## 2024-04-30 PROCEDURE — 1036F TOBACCO NON-USER: CPT | Performed by: PHYSICIAN ASSISTANT

## 2024-04-30 PROCEDURE — 4010F ACE/ARB THERAPY RXD/TAKEN: CPT | Performed by: PHYSICIAN ASSISTANT

## 2024-04-30 PROCEDURE — 1160F RVW MEDS BY RX/DR IN RCRD: CPT | Performed by: PHYSICIAN ASSISTANT

## 2024-04-30 PROCEDURE — 3075F SYST BP GE 130 - 139MM HG: CPT | Performed by: PHYSICIAN ASSISTANT

## 2024-04-30 PROCEDURE — 3048F LDL-C <100 MG/DL: CPT | Performed by: PHYSICIAN ASSISTANT

## 2024-04-30 PROCEDURE — 1159F MED LIST DOCD IN RCRD: CPT | Performed by: PHYSICIAN ASSISTANT

## 2024-04-30 PROCEDURE — 3051F HG A1C>EQUAL 7.0%<8.0%: CPT | Performed by: PHYSICIAN ASSISTANT

## 2024-04-30 PROCEDURE — 3080F DIAST BP >= 90 MM HG: CPT | Performed by: PHYSICIAN ASSISTANT

## 2024-04-30 RX ORDER — BLOOD-GLUCOSE SENSOR
EACH MISCELLANEOUS
Qty: 5 EACH | Refills: 2 | Status: SHIPPED | OUTPATIENT
Start: 2024-04-30

## 2024-04-30 RX ORDER — BLOOD-GLUCOSE,RECEIVER,CONT
EACH MISCELLANEOUS
Qty: 1 EACH | Refills: 0 | Status: SHIPPED | OUTPATIENT
Start: 2024-04-30

## 2024-04-30 RX ORDER — DOXYCYCLINE 100 MG/1
100 CAPSULE ORAL 2 TIMES DAILY
Qty: 20 CAPSULE | Refills: 0 | Status: SHIPPED | OUTPATIENT
Start: 2024-04-30 | End: 2024-05-10

## 2024-04-30 ASSESSMENT — ENCOUNTER SYMPTOMS
ABDOMINAL PAIN: 0
SHORTNESS OF BREATH: 0

## 2024-04-30 NOTE — PROGRESS NOTES
Subjective   Patient ID: Dinah Canales is a 64 y.o. female who presents for Cough (Chest congestion x 1 week).    HPI   Patient c/o cough, nasal discharge or sinus pain. Denies fever, chills, aches, shortness of breath and sore throat.  Present for 7 days. Has worsened since onset.   Cough: Cough is productive of greenish sputum.   Nasal discharge: Described as purulent.   Sinus pain: Experiencing frontal pain and facial pain.   Denies associated diarrhea, earache and vomiting.     Taking OTC coricidin.     Patient denies recent known COVID exposure or international travel.   Did home COVID test that was negative 3 days ago.      reports that she quit smoking about 17 years ago. Her smoking use included cigarettes. She has never used smokeless tobacco.      Allergies   Allergen Reactions    Penicillins Other, Hives and Palpitations     Anaphylactic reaction    Ciprofloxacin Other     Severe  shoulder pain       Review of Systems   Respiratory:  Negative for shortness of breath.    Cardiovascular:  Negative for chest pain.   Gastrointestinal:  Negative for abdominal pain.       Objective   /90   Pulse 61   Temp 36.5 °C (97.7 °F)   SpO2 97%     Physical Exam  Vitals and nursing note reviewed.   Constitutional:       General: She is not in acute distress.     Appearance: Normal appearance.   HENT:      Head: Normocephalic.      Right Ear: Tympanic membrane, ear canal and external ear normal.      Left Ear: Tympanic membrane, ear canal and external ear normal.      Nose: Congestion present.      Right Sinus: Frontal sinus tenderness present. No maxillary sinus tenderness.      Left Sinus: Frontal sinus tenderness present. No maxillary sinus tenderness.      Mouth/Throat:      Mouth: Mucous membranes are moist.      Pharynx: No oropharyngeal exudate or posterior oropharyngeal erythema.   Eyes:      General: No scleral icterus.  Cardiovascular:      Rate and Rhythm: Normal rate and regular rhythm.   Pulmonary:       Effort: Pulmonary effort is normal.      Breath sounds: Normal breath sounds. No wheezing, rhonchi or rales.   Lymphadenopathy:      Cervical: No cervical adenopathy.   Neurological:      Mental Status: She is alert.       Assessment/Plan   Diagnoses and all orders for this visit:  Acute sinusitis, recurrence not specified, unspecified location  -     doxycycline (Vibramycin) 100 mg capsule; Take 1 capsule (100 mg) by mouth 2 times a day for 10 days.     Rx doxycycline.   Can use Mucinex DM.   Encourage fluids and rest.   Follow up if symptoms increase or persist.

## 2024-05-01 ENCOUNTER — TELEPHONE (OUTPATIENT)
Dept: PRIMARY CARE | Facility: CLINIC | Age: 64
End: 2024-05-01
Payer: MEDICARE

## 2024-05-01 DIAGNOSIS — E11.65 TYPE 2 DIABETES MELLITUS WITH HYPERGLYCEMIA, WITHOUT LONG-TERM CURRENT USE OF INSULIN (MULTI): ICD-10-CM

## 2024-05-01 RX ORDER — DULAGLUTIDE 0.75 MG/.5ML
0.75 INJECTION, SOLUTION SUBCUTANEOUS
Qty: 6 ML | Refills: 0 | Status: SHIPPED | OUTPATIENT
Start: 2024-05-05 | End: 2024-05-21 | Stop reason: SDUPTHER

## 2024-05-02 ENCOUNTER — APPOINTMENT (OUTPATIENT)
Dept: OPERATING ROOM | Facility: HOSPITAL | Age: 64
End: 2024-05-02
Payer: MEDICARE

## 2024-05-02 ENCOUNTER — TELEPHONE (OUTPATIENT)
Dept: PRIMARY CARE | Facility: CLINIC | Age: 64
End: 2024-05-02
Payer: MEDICARE

## 2024-05-02 ENCOUNTER — ANESTHESIA (OUTPATIENT)
Dept: OPERATING ROOM | Facility: HOSPITAL | Age: 64
End: 2024-05-02
Payer: MEDICARE

## 2024-05-17 ENCOUNTER — LAB (OUTPATIENT)
Dept: LAB | Facility: LAB | Age: 64
End: 2024-05-17
Payer: MEDICARE

## 2024-05-17 DIAGNOSIS — Z11.4 SCREENING FOR HUMAN IMMUNODEFICIENCY VIRUS: ICD-10-CM

## 2024-05-17 DIAGNOSIS — E78.5 HYPERLIPIDEMIA, UNSPECIFIED HYPERLIPIDEMIA TYPE: ICD-10-CM

## 2024-05-17 DIAGNOSIS — I10 BENIGN ESSENTIAL HYPERTENSION: ICD-10-CM

## 2024-05-17 DIAGNOSIS — E55.9 VITAMIN D DEFICIENCY: ICD-10-CM

## 2024-05-17 DIAGNOSIS — E03.9 HYPOTHYROIDISM, UNSPECIFIED TYPE: ICD-10-CM

## 2024-05-17 DIAGNOSIS — E11.65 TYPE 2 DIABETES MELLITUS WITH HYPERGLYCEMIA, WITHOUT LONG-TERM CURRENT USE OF INSULIN (MULTI): ICD-10-CM

## 2024-05-17 DIAGNOSIS — Z11.59 NEED FOR HEPATITIS C SCREENING TEST: ICD-10-CM

## 2024-05-17 LAB
25(OH)D3 SERPL-MCNC: 16 NG/ML (ref 30–100)
ALBUMIN SERPL BCP-MCNC: 3.7 G/DL (ref 3.4–5)
ALP SERPL-CCNC: 67 U/L (ref 33–136)
ALT SERPL W P-5'-P-CCNC: 20 U/L (ref 7–45)
ANION GAP SERPL CALC-SCNC: 13 MMOL/L (ref 10–20)
AST SERPL W P-5'-P-CCNC: 15 U/L (ref 9–39)
BILIRUB SERPL-MCNC: 0.4 MG/DL (ref 0–1.2)
BUN SERPL-MCNC: 9 MG/DL (ref 6–23)
CALCIUM SERPL-MCNC: 9.2 MG/DL (ref 8.6–10.3)
CHLORIDE SERPL-SCNC: 103 MMOL/L (ref 98–107)
CHOLEST SERPL-MCNC: 142 MG/DL (ref 0–199)
CHOLESTEROL/HDL RATIO: 3.3
CO2 SERPL-SCNC: 29 MMOL/L (ref 21–32)
CREAT SERPL-MCNC: 0.84 MG/DL (ref 0.5–1.05)
EGFRCR SERPLBLD CKD-EPI 2021: 77 ML/MIN/1.73M*2
EST. AVERAGE GLUCOSE BLD GHB EST-MCNC: 128 MG/DL
GLUCOSE SERPL-MCNC: 83 MG/DL (ref 74–99)
HBA1C MFR BLD: 6.1 %
HCV AB SER QL: NONREACTIVE
HDLC SERPL-MCNC: 42.5 MG/DL
HIV 1+2 AB+HIV1 P24 AG SERPL QL IA: NONREACTIVE
LDLC SERPL CALC-MCNC: 74 MG/DL
NON HDL CHOLESTEROL: 100 MG/DL (ref 0–149)
POTASSIUM SERPL-SCNC: 4.6 MMOL/L (ref 3.5–5.3)
PROT SERPL-MCNC: 6.3 G/DL (ref 6.4–8.2)
SODIUM SERPL-SCNC: 140 MMOL/L (ref 136–145)
TRIGL SERPL-MCNC: 126 MG/DL (ref 0–149)
TSH SERPL-ACNC: 1.98 MIU/L (ref 0.44–3.98)
VLDL: 25 MG/DL (ref 0–40)

## 2024-05-17 PROCEDURE — 80061 LIPID PANEL: CPT

## 2024-05-17 PROCEDURE — 36415 COLL VENOUS BLD VENIPUNCTURE: CPT

## 2024-05-17 PROCEDURE — 86803 HEPATITIS C AB TEST: CPT

## 2024-05-17 PROCEDURE — 82306 VITAMIN D 25 HYDROXY: CPT

## 2024-05-17 PROCEDURE — 84443 ASSAY THYROID STIM HORMONE: CPT

## 2024-05-17 PROCEDURE — 87389 HIV-1 AG W/HIV-1&-2 AB AG IA: CPT

## 2024-05-17 PROCEDURE — 80053 COMPREHEN METABOLIC PANEL: CPT

## 2024-05-17 PROCEDURE — 83036 HEMOGLOBIN GLYCOSYLATED A1C: CPT

## 2024-05-22 ENCOUNTER — HOSPITAL ENCOUNTER (OUTPATIENT)
Dept: OPERATING ROOM | Facility: HOSPITAL | Age: 64
Discharge: HOME | End: 2024-05-22
Payer: MEDICARE

## 2024-05-22 VITALS
TEMPERATURE: 98.6 F | OXYGEN SATURATION: 97 % | HEART RATE: 52 BPM | SYSTOLIC BLOOD PRESSURE: 146 MMHG | BODY MASS INDEX: 46.78 KG/M2 | WEIGHT: 264 LBS | DIASTOLIC BLOOD PRESSURE: 79 MMHG | RESPIRATION RATE: 13 BRPM | HEIGHT: 63 IN

## 2024-05-22 DIAGNOSIS — Z12.11 SCREEN FOR COLON CANCER: ICD-10-CM

## 2024-05-22 LAB — GLUCOSE BLD MANUAL STRIP-MCNC: 95 MG/DL (ref 74–99)

## 2024-05-22 PROCEDURE — 2500000004 HC RX 250 GENERAL PHARMACY W/ HCPCS (ALT 636 FOR OP/ED): Performed by: ANESTHESIOLOGY

## 2024-05-22 PROCEDURE — 82947 ASSAY GLUCOSE BLOOD QUANT: CPT

## 2024-05-22 PROCEDURE — 2500000005 HC RX 250 GENERAL PHARMACY W/O HCPCS: Performed by: NURSE ANESTHETIST, CERTIFIED REGISTERED

## 2024-05-22 PROCEDURE — 2500000004 HC RX 250 GENERAL PHARMACY W/ HCPCS (ALT 636 FOR OP/ED): Mod: JW | Performed by: NURSE ANESTHETIST, CERTIFIED REGISTERED

## 2024-05-22 PROCEDURE — 88305 TISSUE EXAM BY PATHOLOGIST: CPT | Performed by: PATHOLOGY

## 2024-05-22 PROCEDURE — 88305 TISSUE EXAM BY PATHOLOGIST: CPT | Mod: TC,91,PORLAB | Performed by: SURGERY

## 2024-05-22 PROCEDURE — 7100000009 HC PHASE TWO TIME - INITIAL BASE CHARGE: Performed by: NURSE ANESTHETIST, CERTIFIED REGISTERED

## 2024-05-22 PROCEDURE — 3600000007 HC OR TIME - EACH INCREMENTAL 1 MINUTE - PROCEDURE LEVEL TWO: Performed by: NURSE ANESTHETIST, CERTIFIED REGISTERED

## 2024-05-22 PROCEDURE — 3600000002 HC OR TIME - INITIAL BASE CHARGE - PROCEDURE LEVEL TWO: Performed by: NURSE ANESTHETIST, CERTIFIED REGISTERED

## 2024-05-22 PROCEDURE — 3700000001 HC GENERAL ANESTHESIA TIME - INITIAL BASE CHARGE: Performed by: NURSE ANESTHETIST, CERTIFIED REGISTERED

## 2024-05-22 PROCEDURE — 3700000002 HC GENERAL ANESTHESIA TIME - EACH INCREMENTAL 1 MINUTE: Performed by: NURSE ANESTHETIST, CERTIFIED REGISTERED

## 2024-05-22 PROCEDURE — 7100000010 HC PHASE TWO TIME - EACH INCREMENTAL 1 MINUTE: Performed by: NURSE ANESTHETIST, CERTIFIED REGISTERED

## 2024-05-22 RX ORDER — PROPOFOL 10 MG/ML
INJECTION, EMULSION INTRAVENOUS AS NEEDED
Status: DISCONTINUED | OUTPATIENT
Start: 2024-05-22 | End: 2024-05-22

## 2024-05-22 RX ORDER — OXYCODONE HYDROCHLORIDE 5 MG/1
5 TABLET ORAL EVERY 4 HOURS PRN
Status: DISCONTINUED | OUTPATIENT
Start: 2024-05-22 | End: 2024-05-23 | Stop reason: HOSPADM

## 2024-05-22 RX ORDER — SODIUM CHLORIDE, SODIUM LACTATE, POTASSIUM CHLORIDE, CALCIUM CHLORIDE 600; 310; 30; 20 MG/100ML; MG/100ML; MG/100ML; MG/100ML
100 INJECTION, SOLUTION INTRAVENOUS CONTINUOUS
Status: DISCONTINUED | OUTPATIENT
Start: 2024-05-22 | End: 2024-05-23 | Stop reason: HOSPADM

## 2024-05-22 RX ORDER — LIDOCAINE HYDROCHLORIDE 10 MG/ML
0.1 INJECTION, SOLUTION EPIDURAL; INFILTRATION; INTRACAUDAL; PERINEURAL ONCE
Status: DISCONTINUED | OUTPATIENT
Start: 2024-05-22 | End: 2024-05-23 | Stop reason: HOSPADM

## 2024-05-22 RX ORDER — ONDANSETRON HYDROCHLORIDE 2 MG/ML
4 INJECTION, SOLUTION INTRAVENOUS ONCE AS NEEDED
Status: DISCONTINUED | OUTPATIENT
Start: 2024-05-22 | End: 2024-05-23 | Stop reason: HOSPADM

## 2024-05-22 RX ORDER — MORPHINE SULFATE 2 MG/ML
1 INJECTION, SOLUTION INTRAMUSCULAR; INTRAVENOUS EVERY 5 MIN PRN
Status: DISCONTINUED | OUTPATIENT
Start: 2024-05-22 | End: 2024-05-23 | Stop reason: HOSPADM

## 2024-05-22 RX ORDER — FAMOTIDINE 10 MG/ML
20 INJECTION INTRAVENOUS ONCE
Status: COMPLETED | OUTPATIENT
Start: 2024-05-22 | End: 2024-05-22

## 2024-05-22 RX ORDER — LIDOCAINE HCL/PF 100 MG/5ML
SYRINGE (ML) INTRAVENOUS AS NEEDED
Status: DISCONTINUED | OUTPATIENT
Start: 2024-05-22 | End: 2024-05-22

## 2024-05-22 RX ORDER — LABETALOL HYDROCHLORIDE 5 MG/ML
INJECTION, SOLUTION INTRAVENOUS AS NEEDED
Status: DISCONTINUED | OUTPATIENT
Start: 2024-05-22 | End: 2024-05-22

## 2024-05-22 RX ADMIN — PROPOFOL 100 MG: 10 INJECTION, EMULSION INTRAVENOUS at 10:07

## 2024-05-22 RX ADMIN — PROPOFOL 20 MG: 10 INJECTION, EMULSION INTRAVENOUS at 10:30

## 2024-05-22 RX ADMIN — PROPOFOL 20 MG: 10 INJECTION, EMULSION INTRAVENOUS at 10:27

## 2024-05-22 RX ADMIN — PROPOFOL 50 MG: 10 INJECTION, EMULSION INTRAVENOUS at 10:17

## 2024-05-22 RX ADMIN — LABETALOL HYDROCHLORIDE 5 MG: 5 INJECTION, SOLUTION INTRAVENOUS at 10:16

## 2024-05-22 RX ADMIN — PROPOFOL 50 MG: 10 INJECTION, EMULSION INTRAVENOUS at 10:13

## 2024-05-22 RX ADMIN — PROPOFOL 20 MG: 10 INJECTION, EMULSION INTRAVENOUS at 10:22

## 2024-05-22 RX ADMIN — SODIUM CHLORIDE, POTASSIUM CHLORIDE, SODIUM LACTATE AND CALCIUM CHLORIDE 100 ML/HR: 600; 310; 30; 20 INJECTION, SOLUTION INTRAVENOUS at 09:29

## 2024-05-22 RX ADMIN — PROPOFOL 40 MG: 10 INJECTION, EMULSION INTRAVENOUS at 10:33

## 2024-05-22 RX ADMIN — PROPOFOL 20 MG: 10 INJECTION, EMULSION INTRAVENOUS at 10:25

## 2024-05-22 RX ADMIN — FAMOTIDINE 20 MG: 10 INJECTION, SOLUTION INTRAVENOUS at 09:29

## 2024-05-22 RX ADMIN — LIDOCAINE HYDROCHLORIDE 50 MG: 20 INJECTION, SOLUTION INTRAVENOUS at 10:07

## 2024-05-22 SDOH — HEALTH STABILITY: MENTAL HEALTH: CURRENT SMOKER: 0

## 2024-05-22 ASSESSMENT — PAIN SCALES - GENERAL
PAINLEVEL_OUTOF10: 0 - NO PAIN
PAIN_LEVEL: 0
PAINLEVEL_OUTOF10: 0 - NO PAIN

## 2024-05-22 ASSESSMENT — COLUMBIA-SUICIDE SEVERITY RATING SCALE - C-SSRS
2. HAVE YOU ACTUALLY HAD ANY THOUGHTS OF KILLING YOURSELF?: NO
6. HAVE YOU EVER DONE ANYTHING, STARTED TO DO ANYTHING, OR PREPARED TO DO ANYTHING TO END YOUR LIFE?: NO
1. IN THE PAST MONTH, HAVE YOU WISHED YOU WERE DEAD OR WISHED YOU COULD GO TO SLEEP AND NOT WAKE UP?: NO

## 2024-05-22 ASSESSMENT — PAIN - FUNCTIONAL ASSESSMENT: PAIN_FUNCTIONAL_ASSESSMENT: 0-10

## 2024-05-22 NOTE — ANESTHESIA POSTPROCEDURE EVALUATION
Patient: Dinah Canales    Procedure Summary       Date: 05/22/24 Room / Location: [facility] OR    Anesthesia Start: 1002 Anesthesia Stop: 1044    Procedure: COLONOSCOPY Diagnosis: Screen for colon cancer    Scheduled Providers: Irma Briones MD Responsible Provider: JOHN PAUL Fung    Anesthesia Type: MAC ASA Status: 3            Anesthesia Type: MAC    Vitals Value Taken Time   /75 05/22/24 1043   Temp 37 °C (98.6 °F) 05/22/24 1043   Pulse 47 05/22/24 1044   Resp 9 05/22/24 1044   SpO2 99 % 05/22/24 1044   Vitals shown include unfiled device data.    Anesthesia Post Evaluation    Patient location during evaluation: PACU  Patient participation: complete - patient participated  Level of consciousness: awake  Pain score: 0  Pain management: adequate  Airway patency: patent  Cardiovascular status: acceptable  Respiratory status: acceptable  Hydration status: acceptable  Postoperative Nausea and Vomiting: none        No notable events documented.

## 2024-05-22 NOTE — H&P
"History Of Present Illness  Dinah Canales is a 64 y.o. female presenting for colonoscopy.     Past Medical History  Past Medical History:   Diagnosis Date    Asthma (HHS-HCC)     Delayed emergence from general anesthesia     Disease of thyroid gland     Glaucoma     marleen stents    Hyperlipidemia     Hypertension     Nephrolithiasis     Reactive airway disease (HHS-HCC)     Type 2 diabetes mellitus (Multi)        Surgical History  Past Surgical History:   Procedure Laterality Date    CATARACT EXTRACTION      HEMORRHOID SURGERY      KNEE SURGERY          Social History  She reports that she quit smoking about 17 years ago. Her smoking use included cigarettes. She has never used smokeless tobacco. She reports that she does not drink alcohol and does not use drugs.    Family History  Family History   Problem Relation Name Age of Onset    Ovarian cancer Mother      Other (mouth cancer) Father      Lupus Sister      Alcohol abuse Other          Allergies  Penicillins and Ciprofloxacin    Review of Systems     Physical Exam  Eyes:      Extraocular Movements: Extraocular movements intact.      Pupils: Pupils are equal, round, and reactive to light.   Cardiovascular:      Rate and Rhythm: Normal rate and regular rhythm.   Pulmonary:      Effort: Pulmonary effort is normal.   Abdominal:      Palpations: Abdomen is soft.   Skin:     General: Skin is warm and dry.   Neurological:      Mental Status: She is alert.   Psychiatric:         Mood and Affect: Mood normal.         Behavior: Behavior normal.          Last Recorded Vitals  Blood pressure (!) 187/87, pulse 73, temperature 36.2 °C (97.1 °F), temperature source Temporal, resp. rate 20, height 1.6 m (5' 3\"), weight 120 kg (264 lb), SpO2 96%.    Relevant Results             Assessment/Plan   Active Problems:  There are no active Hospital Problems.      Here for colonoscopy       I spent 15 minutes in the professional and overall care of this patient.      Irma Briones, " MD

## 2024-05-22 NOTE — ANESTHESIA PREPROCEDURE EVALUATION
Patient: Dinah Canales    Procedure Information       Date/Time: 05/22/24 1100    Scheduled providers: Irma Briones MD    Procedure: COLONOSCOPY    Location: [facility] OR            Relevant Problems   Anesthesia (within normal limits)      Cardiac   (+) Benign essential hypertension   (+) Hyperlipidemia      Pulmonary   (+) Asthma (HHS-HCC)      Neuro   (+) Moderate episode of recurrent major depressive disorder (Multi)      Endocrine   (+) Hypothyroidism   (+) Morbid obesity (Multi)   (+) Type 2 diabetes mellitus (Multi)       Clinical information reviewed:   Tobacco  Allergies  Meds   Med Hx  Surg Hx   Fam Hx  Soc Hx        NPO Detail:  NPO/Void Status  Date of Last Liquid: 05/22/24  Time of Last Liquid: 0000  Date of Last Solid: 05/22/24  Time of Last Solid: 0000         Physical Exam    Airway  TM distance: >3 FB  Neck ROM: full     Cardiovascular - normal exam     Dental   Comments: Few Teeth Left   Pulmonary - normal exam     Abdominal   (+) obese         Anesthesia Plan    History of general anesthesia?: yes  History of complications of general anesthesia?: no    ASA 3     MAC     The patient is not a current smoker.    intravenous induction   Anesthetic plan and risks discussed with patient.  Use of blood products discussed with patient who.    Plan discussed with CRNA.

## 2024-05-28 ENCOUNTER — HOSPITAL ENCOUNTER (OUTPATIENT)
Dept: RADIOLOGY | Facility: CLINIC | Age: 64
Discharge: HOME | End: 2024-05-28
Payer: MEDICARE

## 2024-05-28 DIAGNOSIS — Z78.0 POSTMENOPAUSAL STATUS (AGE-RELATED) (NATURAL): ICD-10-CM

## 2024-05-28 DIAGNOSIS — E11.65 TYPE 2 DIABETES MELLITUS WITH HYPERGLYCEMIA, WITHOUT LONG-TERM CURRENT USE OF INSULIN (MULTI): Chronic | ICD-10-CM

## 2024-05-28 PROCEDURE — 77080 DXA BONE DENSITY AXIAL: CPT | Performed by: RADIOLOGY

## 2024-05-28 PROCEDURE — 77080 DXA BONE DENSITY AXIAL: CPT

## 2024-05-28 RX ORDER — BLOOD-GLUCOSE,RECEIVER,CONT
EACH MISCELLANEOUS
Qty: 1 EACH | Refills: 0 | OUTPATIENT
Start: 2024-05-28

## 2024-05-30 LAB
LABORATORY COMMENT REPORT: NORMAL
PATH REPORT.FINAL DX SPEC: NORMAL
PATH REPORT.GROSS SPEC: NORMAL
PATH REPORT.MICROSCOPIC SPEC OTHER STN: NORMAL
PATH REPORT.RELEVANT HX SPEC: NORMAL
PATH REPORT.TOTAL CANCER: NORMAL

## 2024-06-26 DIAGNOSIS — E11.65 TYPE 2 DIABETES MELLITUS WITH HYPERGLYCEMIA, WITHOUT LONG-TERM CURRENT USE OF INSULIN (MULTI): Chronic | ICD-10-CM

## 2024-06-26 RX ORDER — BLOOD-GLUCOSE SENSOR
EACH MISCELLANEOUS
Qty: 5 EACH | Refills: 2 | Status: SHIPPED | OUTPATIENT
Start: 2024-06-26

## 2024-06-26 RX ORDER — BLOOD-GLUCOSE,RECEIVER,CONT
EACH MISCELLANEOUS
Qty: 1 EACH | Refills: 0 | Status: SHIPPED | OUTPATIENT
Start: 2024-06-26

## 2024-06-26 NOTE — TELEPHONE ENCOUNTER
----- Message from Dinah Canales sent at 6/26/2024  8:44 AM EDT -----  Regarding: [employer]  Contact: [phone]  Out of refills for Redeemia

## 2024-07-01 ENCOUNTER — TELEPHONE (OUTPATIENT)
Dept: PRIMARY CARE | Facility: CLINIC | Age: 64
End: 2024-07-01
Payer: MEDICARE

## 2024-07-01 NOTE — TELEPHONE ENCOUNTER
Called pt and informed of [employer] message, she stated that she submitted an appeal a couple of days ago and is waiting to hear back, declined other options until she hears back. Thanks, AM

## 2024-07-01 NOTE — TELEPHONE ENCOUNTER
Called pharmacy and spoke to pharmacist, she stated that the PA is not required but it costs $558, how would you like to proceed? Please advise, AM

## 2024-07-01 NOTE — TELEPHONE ENCOUNTER
"----- Message from Cecelia La DO sent at 7/1/2024  1:28 PM EDT -----  Regarding: FW: Trulicity  Contact: [phone]  Please call the pharmacy to see if we can get more information about what's going on.  ----- Message -----  From: Callie Levy MA  Sent: 7/1/2024  12:59 PM EDT  To: Cecelia La DO  Subject: FW: Trulicity                                    Last PA was initiated on 5/2. This is what the PA stated when we tried to Prior Auth \"available without authorization. The member is able to fill the requested drug at the pharmacy.\". Unsure why pt is not able to pick this medication up. Please advise how to proceed? Thanks, RADHA  ----- Message -----  From: Cecelia La DO  Sent: 7/1/2024  12:52 PM EDT  To: #  Subject: FW: Trulicity                                    Did we do a recent prior auth for Trulicity for her?  ----- Message -----  From: Linette Pope MA  Sent: 7/1/2024  11:22 AM EDT  To: Cecelia La DO  Subject: FW: Trulicity                                    Please advise. Thanks. EFRAIN  ----- Message -----  From: Dinah Canales  Sent: 7/1/2024  11:11 AM EDT  To: #  Subject: Trulicity                                        Hi have not had my trulicity for 2 weeks put myself back on glipazide and my sugars are higher average about 160 i know its in range but dont want to go higher i feel i really need the trulicity so i appealed the pharmacy at [facility] [place] not sure if they will contact your office did this on june 22nd said they will give me an answer hopefully soon if its no supposedly there are other appeals i can make and will since i went back on trulicity i had lost 17 lbs now have not had for 2 weeks i have gained 3 back          "

## 2024-07-01 NOTE — TELEPHONE ENCOUNTER
Let patient know that we contacted her pharmacy for more info. Even with prior auth completed, med is going to cost $558. She could try to call her insurance to see if any cheaper options. Alternatively, I can refer her to the  pharmacy team to see if they are able to get her the medication at a more affordable cost.    Statement Selected

## 2024-07-10 ENCOUNTER — PATIENT MESSAGE (OUTPATIENT)
Dept: PRIMARY CARE | Facility: CLINIC | Age: 64
End: 2024-07-10
Payer: MEDICARE

## 2024-07-10 DIAGNOSIS — E11.65 TYPE 2 DIABETES MELLITUS WITH HYPERGLYCEMIA, WITHOUT LONG-TERM CURRENT USE OF INSULIN (MULTI): Primary | Chronic | ICD-10-CM

## 2024-07-11 DIAGNOSIS — E11.65 TYPE 2 DIABETES MELLITUS WITH HYPERGLYCEMIA, WITHOUT LONG-TERM CURRENT USE OF INSULIN (MULTI): Primary | Chronic | ICD-10-CM

## 2024-08-01 NOTE — PROGRESS NOTES
Pharmacist Clinic: Diabetes Management  Dinah Canales is a 64 y.o. female was referred to Clinical Pharmacy Team for diabetes management.   Referring Provider: Cecelia La, *  - Last visit with referring provider: 5/21/24    Subjective     HPI    Patient was on glipizide, Trulicity, and metformin few months ago and had lows. Was told to stop glipizide and continue metformin and Trulicity. Was doing very well until Trulicity went from $42 to $500-$1000 a month. Therefore, stopped Trulicity and currently restarted up her glipizide until able to get Trulicity cost affordable.    Current Diabetes Pharmacotherapy:    - Metformin 1000 mg BID  - Glipizide 5 mg BID with meals (restarted this when ran out of [employer] in May)     Social History:  Current diet:   - 2 meals a day  - Does not eat that much   - Does not eat sweets  - Fruits  - B: Scrambled eggs with veggies  - D: Chicken, fish, steak   - Lost 17 lbs when she was on Trulicity    Current exercise:   - Runs around taking care of 13 grandchildren     Eye exam for this year?: no - September 2024  Foot exam for this year?: no     Current monitoring regimen:   Patient is using: continuous glucose monitor  Type of CGM: Martin 3    Reported blood sugars:     Current sugar: 100   Average = 120     > 180 = 12%   = 76%  54-69 =12%    Any episodes of hypoglycemia? Yes  - Around 2-3 in the morning  - Not taking glipizide with meals    Adverse Effects: Diarrhea once in awhile (tolerable)    Objective     There were no vitals taken for this visit.    Allergies   Allergen Reactions    Penicillins Other, Hives and Palpitations     Anaphylactic reaction    Ciprofloxacin Other     Severe  shoulder pain       Historical Diabetes Pharmacotherapy:  - Trulicity (costly)  - Jardiance (yeast infections)  - Farxiga (costly but tolerated)     SECONDARY PREVENTION  - Statin? Yes   - ACE-I/ARB? Yes  - Aspirin? No    Pertinent PMH Review:  - PMH of Pancreatitis: No  - PMH of  Retinopathy: No  - PMH of Urinary Tract Infections: No  - PMH of Yeast Infections: No  - PMH of MTC: No    Lab Review  Lab Results   Component Value Date    BILITOT 0.4 05/17/2024    CALCIUM 9.2 05/17/2024    CO2 29 05/17/2024     05/17/2024    CREATININE 0.84 05/17/2024    GLUCOSE 83 05/17/2024    ALKPHOS 67 05/17/2024    K 4.6 05/17/2024    PROT 6.3 (L) 05/17/2024     05/17/2024    AST 15 05/17/2024    ALT 20 05/17/2024    BUN 9 05/17/2024    ANIONGAP 13 05/17/2024    ALBUMIN 3.7 05/17/2024    GFRF 72 01/24/2023     Lab Results   Component Value Date    TRIG 126 05/17/2024    CHOL 142 05/17/2024    HDL 42.5 05/17/2024     Lab Results   Component Value Date    HGBA1C 6.1 (H) 05/17/2024    HGBA1C 7.0 (H) 02/16/2024    HGBA1C 6.6 (A) 01/24/2023     The 10-year ASCVD risk score (Andria MC, et al., 2019) is: 16.6%    Values used to calculate the score:      Age: 64 years      Sex: Female      Is Non- : No      Diabetic: Yes      Tobacco smoker: No      Systolic Blood Pressure: 146 mmHg      Is BP treated: Yes      HDL Cholesterol: 42.5 mg/dL      Total Cholesterol: 142 mg/dL    Drug Interactions:  None    Affordability/Accessibility:  Trouble affording Trulicity    Household: 2 ()  Filed taxes (together)  < $81,000     Preferred Pharmacy:  [facility]    Assessment/Plan   Problem List Items Addressed This Visit       Type 2 diabetes mellitus (Multi) (Chronic)    Relevant Medications    dulaglutide (Trulicity) 0.75 mg/0.5 mL pen injector    Other Relevant Orders    Follow Up In Clinical Pharmacy       ASSESSMENT:  Patients diabetes is controlled with most recent A1c of 6.1%.     Patient states cost of Trulicity has increased. She has substituted Trulicity with her Glipizide and having 12% lows on Martin (not taking with meals). We will re-start her on Trulicity and apply for PAP. Glipizide to be stopped as well. Also due for A1c soon. Will send in order for that next  appointment.    PLAN:  START Trulicity 0.75 mg   STOP Glipizide   CONTINUE Metformin  Education provided: Educated on lows associated with glipizide/meals, Trulicity side effects such as GI upset  Follow up with clinical pharmacist: 9/6/24 @ 1    Follow up with PCP: 8/23/24    Thank you,  Tiesha Griffin, PharmD  Clinical Pharmacy Specialist  [phone]  [email or web address]     Continue all meds under the continuation of care with the referring provider and clinical pharmacy team.

## 2024-08-02 ENCOUNTER — APPOINTMENT (OUTPATIENT)
Dept: PHARMACY | Facility: HOSPITAL | Age: 64
End: 2024-08-02
Payer: MEDICARE

## 2024-08-02 DIAGNOSIS — E11.65 TYPE 2 DIABETES MELLITUS WITH HYPERGLYCEMIA, WITHOUT LONG-TERM CURRENT USE OF INSULIN (MULTI): Chronic | ICD-10-CM

## 2024-08-02 PROCEDURE — RXMED WILLOW AMBULATORY MEDICATION CHARGE

## 2024-08-02 RX ORDER — DULAGLUTIDE 0.75 MG/.5ML
0.75 INJECTION, SOLUTION SUBCUTANEOUS
Qty: 2 ML | Refills: 1 | Status: SHIPPED | OUTPATIENT
Start: 2024-08-02

## 2024-08-05 ENCOUNTER — TELEPHONE (OUTPATIENT)
Dept: PHARMACY | Facility: HOSPITAL | Age: 64
End: 2024-08-05
Payer: MEDICARE

## 2024-08-05 ENCOUNTER — PHARMACY VISIT (OUTPATIENT)
Dept: PHARMACY | Facility: CLINIC | Age: 64
End: 2024-08-05
Payer: MEDICARE

## 2024-08-05 NOTE — TELEPHONE ENCOUNTER
Patient Assistance Program Approval:     We are pleased to inform you that your application for assistance has been approved.    This approval is valid through 8/2/2025 as long as the following criteria continue to be satisfied:     Your medication (TRULICITY) remains covered under your current insurance plan.   Your prescriber does not discontinue therapy.   You do not seek reimbursement from any other private or government-funded programs for the  medication.    Under this program, the pharmacy will first bill your insurance plan for your specified medication. The [employer] Assistance Fund will then offset your copay balance, so that your out-of pocket expense for your medication will be $0.00.     Medication will be filled through [facility], and mailed to the patient. Contacted on the status of the approval. Provided the [facility] phone number, and made aware that they will be hearing from someone at [facility] to set up delivery for the prescriptions.     Please reach out to the Clinical Pharmacy Team if there are any further questions.     Follow up with Clinical Pharmacy Team: 1 year for VAF renewal    Tiesha Griffin, PharmD  Clinical Pharmacy Specialist - Primary Care  [phone]  [email or web address]    Continue all meds under the continuation of care with the referring provider and clinical pharmacy team.    Verbal consent to manage patient's drug therapy was obtained from patient. They were informed they may decline to participate or withdraw from participation in pharmacy services at any time.

## 2024-08-20 ENCOUNTER — LAB (OUTPATIENT)
Dept: LAB | Facility: LAB | Age: 64
End: 2024-08-20
Payer: MEDICARE

## 2024-08-20 DIAGNOSIS — I10 BENIGN ESSENTIAL HYPERTENSION: ICD-10-CM

## 2024-08-20 DIAGNOSIS — E03.9 HYPOTHYROIDISM, UNSPECIFIED TYPE: ICD-10-CM

## 2024-08-20 DIAGNOSIS — E55.9 VITAMIN D DEFICIENCY: ICD-10-CM

## 2024-08-20 DIAGNOSIS — E11.65 TYPE 2 DIABETES MELLITUS WITH HYPERGLYCEMIA, WITHOUT LONG-TERM CURRENT USE OF INSULIN (MULTI): ICD-10-CM

## 2024-08-20 LAB
25(OH)D3 SERPL-MCNC: 43 NG/ML (ref 30–100)
ALBUMIN SERPL BCP-MCNC: 3.9 G/DL (ref 3.4–5)
ALP SERPL-CCNC: 74 U/L (ref 33–136)
ALT SERPL W P-5'-P-CCNC: 15 U/L (ref 7–45)
ANION GAP SERPL CALC-SCNC: 11 MMOL/L (ref 10–20)
AST SERPL W P-5'-P-CCNC: 10 U/L (ref 9–39)
BASOPHILS # BLD AUTO: 0.09 X10*3/UL (ref 0–0.1)
BASOPHILS NFR BLD AUTO: 0.6 %
BILIRUB SERPL-MCNC: 0.3 MG/DL (ref 0–1.2)
BUN SERPL-MCNC: 15 MG/DL (ref 6–23)
CALCIUM SERPL-MCNC: 9.7 MG/DL (ref 8.6–10.3)
CHLORIDE SERPL-SCNC: 105 MMOL/L (ref 98–107)
CO2 SERPL-SCNC: 29 MMOL/L (ref 21–32)
CREAT SERPL-MCNC: 0.84 MG/DL (ref 0.5–1.05)
EGFRCR SERPLBLD CKD-EPI 2021: 77 ML/MIN/1.73M*2
EOSINOPHIL # BLD AUTO: 0.44 X10*3/UL (ref 0–0.7)
EOSINOPHIL NFR BLD AUTO: 2.8 %
ERYTHROCYTE [DISTWIDTH] IN BLOOD BY AUTOMATED COUNT: 13.5 % (ref 11.5–14.5)
EST. AVERAGE GLUCOSE BLD GHB EST-MCNC: 140 MG/DL
GLUCOSE SERPL-MCNC: 109 MG/DL (ref 74–99)
HBA1C MFR BLD: 6.5 %
HCT VFR BLD AUTO: 41.7 % (ref 36–46)
HGB BLD-MCNC: 13.3 G/DL (ref 12–16)
IMM GRANULOCYTES # BLD AUTO: 0.06 X10*3/UL (ref 0–0.7)
IMM GRANULOCYTES NFR BLD AUTO: 0.4 % (ref 0–0.9)
LYMPHOCYTES # BLD AUTO: 2.29 X10*3/UL (ref 1.2–4.8)
LYMPHOCYTES NFR BLD AUTO: 14.5 %
MCH RBC QN AUTO: 28.9 PG (ref 26–34)
MCHC RBC AUTO-ENTMCNC: 31.9 G/DL (ref 32–36)
MCV RBC AUTO: 91 FL (ref 80–100)
MONOCYTES # BLD AUTO: 0.85 X10*3/UL (ref 0.1–1)
MONOCYTES NFR BLD AUTO: 5.4 %
NEUTROPHILS # BLD AUTO: 12.02 X10*3/UL (ref 1.2–7.7)
NEUTROPHILS NFR BLD AUTO: 76.3 %
NRBC BLD-RTO: 0 /100 WBCS (ref 0–0)
PLATELET # BLD AUTO: 293 X10*3/UL (ref 150–450)
POTASSIUM SERPL-SCNC: 5.1 MMOL/L (ref 3.5–5.3)
PROT SERPL-MCNC: 6.8 G/DL (ref 6.4–8.2)
RBC # BLD AUTO: 4.6 X10*6/UL (ref 4–5.2)
SODIUM SERPL-SCNC: 140 MMOL/L (ref 136–145)
TSH SERPL-ACNC: 1.39 MIU/L (ref 0.44–3.98)
WBC # BLD AUTO: 15.8 X10*3/UL (ref 4.4–11.3)

## 2024-08-20 PROCEDURE — 83036 HEMOGLOBIN GLYCOSYLATED A1C: CPT

## 2024-08-20 PROCEDURE — 84443 ASSAY THYROID STIM HORMONE: CPT

## 2024-08-20 PROCEDURE — 82306 VITAMIN D 25 HYDROXY: CPT

## 2024-08-20 PROCEDURE — 36415 COLL VENOUS BLD VENIPUNCTURE: CPT

## 2024-08-20 PROCEDURE — 80053 COMPREHEN METABOLIC PANEL: CPT

## 2024-08-20 PROCEDURE — 85025 COMPLETE CBC W/AUTO DIFF WBC: CPT

## 2024-08-23 ENCOUNTER — APPOINTMENT (OUTPATIENT)
Dept: PRIMARY CARE | Facility: CLINIC | Age: 64
End: 2024-08-23
Payer: MEDICARE

## 2024-08-23 VITALS
SYSTOLIC BLOOD PRESSURE: 128 MMHG | WEIGHT: 250 LBS | TEMPERATURE: 96.4 F | BODY MASS INDEX: 44.3 KG/M2 | HEART RATE: 52 BPM | OXYGEN SATURATION: 98 % | HEIGHT: 63 IN | DIASTOLIC BLOOD PRESSURE: 72 MMHG

## 2024-08-23 DIAGNOSIS — I10 BENIGN ESSENTIAL HYPERTENSION: ICD-10-CM

## 2024-08-23 DIAGNOSIS — E78.5 HYPERLIPIDEMIA, UNSPECIFIED HYPERLIPIDEMIA TYPE: ICD-10-CM

## 2024-08-23 DIAGNOSIS — J45.20 MILD INTERMITTENT ASTHMA WITHOUT COMPLICATION (HHS-HCC): Chronic | ICD-10-CM

## 2024-08-23 DIAGNOSIS — E03.9 HYPOTHYROIDISM, UNSPECIFIED TYPE: ICD-10-CM

## 2024-08-23 DIAGNOSIS — E11.65 TYPE 2 DIABETES MELLITUS WITH HYPERGLYCEMIA, WITHOUT LONG-TERM CURRENT USE OF INSULIN (MULTI): Chronic | ICD-10-CM

## 2024-08-23 DIAGNOSIS — Z12.4 SCREENING FOR CERVICAL CANCER: ICD-10-CM

## 2024-08-23 DIAGNOSIS — F33.1 DEPRESSION, MAJOR, RECURRENT, MODERATE (MULTI): ICD-10-CM

## 2024-08-23 DIAGNOSIS — J30.9 ALLERGIC RHINITIS, UNSPECIFIED SEASONALITY, UNSPECIFIED TRIGGER: ICD-10-CM

## 2024-08-23 DIAGNOSIS — J01.90 ACUTE SINUSITIS, RECURRENCE NOT SPECIFIED, UNSPECIFIED LOCATION: ICD-10-CM

## 2024-08-23 DIAGNOSIS — Z00.00 WELLNESS EXAMINATION: Primary | ICD-10-CM

## 2024-08-23 PROCEDURE — 99397 PER PM REEVAL EST PAT 65+ YR: CPT | Performed by: FAMILY MEDICINE

## 2024-08-23 PROCEDURE — 99214 OFFICE O/P EST MOD 30 MIN: CPT | Performed by: FAMILY MEDICINE

## 2024-08-23 PROCEDURE — 87624 HPV HI-RISK TYP POOLED RSLT: CPT

## 2024-08-23 RX ORDER — ALBUTEROL SULFATE 90 UG/1
2 INHALANT RESPIRATORY (INHALATION) EVERY 4 HOURS PRN
Qty: 18 G | Refills: 11 | Status: SHIPPED | OUTPATIENT
Start: 2024-08-23 | End: 2025-08-23

## 2024-08-23 RX ORDER — ATORVASTATIN CALCIUM 20 MG/1
20 TABLET, FILM COATED ORAL DAILY
Qty: 90 TABLET | Refills: 1 | Status: SHIPPED | OUTPATIENT
Start: 2024-08-23

## 2024-08-23 RX ORDER — METFORMIN HYDROCHLORIDE 1000 MG/1
1000 TABLET ORAL 2 TIMES DAILY
Qty: 180 TABLET | Refills: 1 | Status: SHIPPED | OUTPATIENT
Start: 2024-08-23

## 2024-08-23 RX ORDER — MONTELUKAST SODIUM 10 MG/1
10 TABLET ORAL NIGHTLY
Qty: 90 TABLET | Refills: 1 | Status: SHIPPED | OUTPATIENT
Start: 2024-08-23

## 2024-08-23 RX ORDER — LISINOPRIL 40 MG/1
40 TABLET ORAL DAILY
Qty: 90 TABLET | Refills: 1 | Status: SHIPPED | OUTPATIENT
Start: 2024-08-23

## 2024-08-23 RX ORDER — LEVOTHYROXINE SODIUM 112 UG/1
TABLET ORAL
Qty: 120 TABLET | Refills: 1 | Status: SHIPPED | OUTPATIENT
Start: 2024-08-23

## 2024-08-23 RX ORDER — FLUTICASONE PROPIONATE 50 MCG
1 SPRAY, SUSPENSION (ML) NASAL DAILY
Qty: 16 G | Refills: 5 | Status: SHIPPED | OUTPATIENT
Start: 2024-08-23 | End: 2025-08-23

## 2024-08-23 RX ORDER — DULAGLUTIDE 0.75 MG/.5ML
0.75 INJECTION, SOLUTION SUBCUTANEOUS
Qty: 2 ML | Refills: 1 | Status: CANCELLED | OUTPATIENT
Start: 2024-08-25

## 2024-08-23 RX ORDER — BUPROPION HYDROCHLORIDE 150 MG/1
150 TABLET ORAL DAILY
Qty: 90 TABLET | Refills: 1 | Status: SHIPPED | OUTPATIENT
Start: 2024-08-23

## 2024-08-23 ASSESSMENT — ENCOUNTER SYMPTOMS
VOMITING: 0
NAUSEA: 0
SHORTNESS OF BREATH: 0
ABDOMINAL PAIN: 0
FREQUENCY: 0
ADENOPATHY: 0
DIARRHEA: 0
COUGH: 0
CHILLS: 0
DYSURIA: 0

## 2024-08-23 NOTE — PROGRESS NOTES
"Subjective   Patient ID: Dinah Canales is a 64 y.o. female who presents for Gynecologic Exam (Patient presents for pap).    Dinah has been feeling well. No new concerns.     Last Pap smear many years ago. No prior abnormal Pap smears. No breast changes or pain.     Feeling good on current dose of Trulicity. Successful losing weight.     Bps have been stable.     Allergies have been worse over last few weeks. Having congestion, sinus pressure. Was told that she could not use Flonase due to diabetes. Was using cetirizine but stopped since not working.          Review of Systems   Constitutional:  Negative for chills.   HENT:  Negative for congestion.    Respiratory:  Negative for cough and shortness of breath.    Cardiovascular:  Negative for chest pain.   Gastrointestinal:  Negative for abdominal pain, diarrhea, nausea and vomiting.   Genitourinary:  Negative for dysuria, frequency, pelvic pain, vaginal bleeding, vaginal discharge and vaginal pain.   Hematological:  Negative for adenopathy.       Objective   /72 Comment: home bp  Pulse 52   Temp 35.8 °C (96.4 °F)   Ht 1.61 m (5' 3.39\")   Wt 113 kg (250 lb)   SpO2 98%   BMI 43.75 kg/m²     Physical Exam  Exam conducted with a chaperone present.   Constitutional:       General: She is not in acute distress.     Appearance: Normal appearance.   HENT:      Head: Normocephalic.      Mouth/Throat:      Mouth: Mucous membranes are moist.   Eyes:      Extraocular Movements: Extraocular movements intact.      Conjunctiva/sclera: Conjunctivae normal.   Cardiovascular:      Rate and Rhythm: Normal rate and regular rhythm.      Heart sounds: No murmur heard.  Pulmonary:      Breath sounds: No wheezing or rhonchi.   Chest:   Breasts:     Right: No swelling, inverted nipple, mass, nipple discharge, skin change or tenderness.      Left: No swelling, inverted nipple, mass, nipple discharge, skin change or tenderness.   Genitourinary:     Pubic Area: No rash.       Labia:  "        Right: No rash or lesion.         Left: No rash or lesion.       Vagina: No vaginal discharge, bleeding or lesions.      Cervix: No cervical motion tenderness.      Uterus: Not tender.    Musculoskeletal:      Cervical back: Neck supple.   Skin:     General: Skin is warm and dry.   Neurological:      Mental Status: She is alert.   Psychiatric:         Mood and Affect: Mood normal.         Behavior: Behavior normal.         Assessment/Plan   Problem List Items Addressed This Visit             ICD-10-CM    Allergic rhinitis J30.9    Relevant Medications    montelukast (Singulair) 10 mg tablet    fluticasone (Flonase) 50 mcg/actuation nasal spray    Asthma (HHS-HCC) (Chronic) J45.909     Stable.          Relevant Medications    albuterol 90 mcg/actuation inhaler    Benign essential hypertension (Chronic) I10     Controlled.          Relevant Medications    lisinopril 40 mg tablet    Hyperlipidemia (Chronic) E78.5    Relevant Medications    atorvastatin (Lipitor) 20 mg tablet    Other Relevant Orders    Lipid Panel    Hypothyroidism (Chronic) E03.9     TSH stable.          Relevant Medications    levothyroxine (Synthroid, Levoxyl) 112 mcg tablet    Type 2 diabetes mellitus (Multi) (Chronic) E11.9     Controlled. Continue Trulicity.          Relevant Medications    metFORMIN (Glucophage) 1,000 mg tablet    Other Relevant Orders    CBC and Auto Differential    Comprehensive Metabolic Panel    Hemoglobin A1C     Other Visit Diagnoses         Codes    Wellness examination    -  Primary Z00.00    Pap smear sent. Mammogram and colon cancer screening up-to-date. Plan micrpalbumin at next appointment.     Depression, major, recurrent, moderate (Multi)     F33.1    Relevant Medications    buPROPion XL (Wellbutrin XL) 150 mg 24 hr tablet    Acute sinusitis, recurrence not specified, unspecified location     J01.90    Try Flonase. If symptoms not improving next week, ok to call for antibiotic rx.     Screening for cervical  cancer     Z12.4    Relevant Orders    THINPREP PAP TEST

## 2024-08-26 PROCEDURE — RXMED WILLOW AMBULATORY MEDICATION CHARGE

## 2024-08-28 ENCOUNTER — PHARMACY VISIT (OUTPATIENT)
Dept: PHARMACY | Facility: CLINIC | Age: 64
End: 2024-08-28
Payer: MEDICARE

## 2024-09-06 ENCOUNTER — APPOINTMENT (OUTPATIENT)
Dept: PHARMACY | Facility: HOSPITAL | Age: 64
End: 2024-09-06
Payer: MEDICARE

## 2024-09-06 DIAGNOSIS — E11.65 TYPE 2 DIABETES MELLITUS WITH HYPERGLYCEMIA, WITHOUT LONG-TERM CURRENT USE OF INSULIN (MULTI): Chronic | ICD-10-CM

## 2024-09-06 RX ORDER — DULAGLUTIDE 0.75 MG/.5ML
0.75 INJECTION, SOLUTION SUBCUTANEOUS WEEKLY
Qty: 2 ML | Refills: 4 | Status: SHIPPED | OUTPATIENT
Start: 2024-09-16

## 2024-09-06 RX ORDER — DULAGLUTIDE 0.75 MG/.5ML
0.75 INJECTION, SOLUTION SUBCUTANEOUS
Qty: 2 ML | Refills: 5 | Status: SHIPPED | OUTPATIENT
Start: 2024-09-16 | End: 2024-09-06 | Stop reason: ENTERED-IN-ERROR

## 2024-09-06 NOTE — PROGRESS NOTES
Pharmacist Clinic: Diabetes Management  Dinah Canales is a 64 y.o. female was referred to Clinical Pharmacy Team for diabetes management.   Referring Provider: Cecelia La, *  - Last visit with referring provider: 8/23/24     Patient Assistance for TRULICITY approved through 8/2/2025. Will have to be renewed prior to that date to prevent lapse in coverage. Medication(s) will be received at no cost to patient from [facility].     Subjective     HPI    Current Diabetes Pharmacotherapy:    - Metformin 1000 mg BID  - Trulicity 0.75 mg weekly - Saturdays     Social History:  Current diet:   - 2 meals a day  - Does not eat that much   - Does not eat sweets  - Fruits  - B: Scrambled eggs with veggies  - D: Chicken, fish, steak   - Lost 21 lbs on Trulicity  - Does not eat past 6 pm  - Does not like potatoes    Current exercise:   - Runs around taking care of 13 grandchildren     Eye exam for this year?: no - September 2024  Foot exam for this year?: no     Current monitoring regimen:   Patient is using: continuous glucose monitor  Type of CGM: Martin 3    Reported blood sugars:   APG Report is below   - Sensors have been popping off or not working; states she is putting on correctly; coming off with covers on too  - Martin is sending her new box     Any episodes of hypoglycemia? Yes  - Occasionally having lows into 55-60's in the morning around 5-6 am  - Does not eat any food after 6 pm     Adverse Effects: None         Objective     There were no vitals taken for this visit.    Allergies   Allergen Reactions    Penicillins Other, Hives and Palpitations     Anaphylactic reaction    Ciprofloxacin Other     Severe  shoulder pain       Historical Diabetes Pharmacotherapy:  - Trulicity (costly)  - Jardiance (yeast infections)  - Farxiga (costly but tolerated)     SECONDARY PREVENTION  - Statin? Yes   - ACE-I/ARB? Yes  - Aspirin? No    Pertinent PMH Review:  - PMH of Pancreatitis: No  - PMH of Retinopathy:  No  - PMH of Urinary Tract Infections: No  - PMH of Yeast Infections: No  - PMH of MTC: No    Lab Review  Lab Results   Component Value Date    BILITOT 0.3 08/20/2024    CALCIUM 9.7 08/20/2024    CO2 29 08/20/2024     08/20/2024    CREATININE 0.84 08/20/2024    GLUCOSE 109 (H) 08/20/2024    ALKPHOS 74 08/20/2024    K 5.1 08/20/2024    PROT 6.8 08/20/2024     08/20/2024    AST 10 08/20/2024    ALT 15 08/20/2024    BUN 15 08/20/2024    ANIONGAP 11 08/20/2024    ALBUMIN 3.9 08/20/2024    GFRF 72 01/24/2023     Lab Results   Component Value Date    TRIG 126 05/17/2024    CHOL 142 05/17/2024    HDL 42.5 05/17/2024     Lab Results   Component Value Date    HGBA1C 6.5 (H) 08/20/2024    HGBA1C 6.1 (H) 05/17/2024    HGBA1C 7.0 (H) 02/16/2024     The 10-year ASCVD risk score (Andria MC, et al., 2019) is: 13%    Values used to calculate the score:      Age: 64 years      Sex: Female      Is Non- : No      Diabetic: Yes      Tobacco smoker: No      Systolic Blood Pressure: 128 mmHg      Is BP treated: Yes      HDL Cholesterol: 42.5 mg/dL      Total Cholesterol: 142 mg/dL    Drug Interactions:  None    Affordability/Accessibility:  Trouble affording Trulicity    Household: 2 ()  Filed taxes (together)  < $81,000     Preferred [facility]acy:  [facility]    Assessment/Plan   Problem List Items Addressed This Visit       Type 2 diabetes mellitus (Multi) (Chronic)    Relevant Medications    dulaglutide (Trulicity) 0.75 mg/0.5 mL pen injector (Start on 9/16/2024)    Other Relevant Orders    Follow Up In Clinical Pharmacy         ASSESSMENT:  Patients diabetes is controlled with most recent A1c of 6.5%.     A1c came back as 6.5%. Expected as she was out of Trulicity before we reinitiated in August. CGM report is excellent since being on Trulicity and Metformin. There are a few lows in the early mornings. She does not eat after 6 pm so this could be why. I told her if possible, to try to eat a snack  before bed such as PB crackers so that the sugars are stabilized overnight. If she is still noticing some morning lows, then to cut her Metformin dose in half at night.     PLAN:  CONTINUE current DM medications  Incorporate night time snack such as PB crackers  If still going low in morning, cut PM dose of Metformin in half (500 mg)  Follow up with clinical pharmacist: 10/4/24 @ 10 am  Follow up with PCP: 2/24/24    Thank you,  Tiesha Griffin, PharmD  Clinical Pharmacy Specialist  [phone]  [email or web address]     Continue all meds under the continuation of care with the referring provider and clinical pharmacy team.

## 2024-09-16 PROCEDURE — RXMED WILLOW AMBULATORY MEDICATION CHARGE

## 2024-09-18 ENCOUNTER — PHARMACY VISIT (OUTPATIENT)
Dept: PHARMACY | Facility: CLINIC | Age: 64
End: 2024-09-18
Payer: MEDICARE

## 2024-09-22 ASSESSMENT — EXTERNAL EXAM - RIGHT EYE: OD_EXAM: NORMAL

## 2024-09-22 ASSESSMENT — EXTERNAL EXAM - LEFT EYE: OS_EXAM: NORMAL

## 2024-09-23 NOTE — PROGRESS NOTES
Glaucoma suspect, bilateral  -(+)FH glaucoma - son  -History of using latanoprost OU QHS for elevated IOP. Stopped using after CEIOL/MIGS (stent), done OU in 2021.   -Pachymetry (9/25/24) - 566/575  -OCT RNFL (9/25/24) - SS: 7/10 OD and 9/10 OS. OD: WNL. OS: Bord I. 84/88.   -F/u 6-8 months - IOP check with HVF 24-2.     Epiretinal membrane, bilateral  -OCT macula (9/25/24) - SS: 9/10 OU. ERM OU with mild surface distortion. Mild intraretinal edema OS? 298/264.   -D/w patient exam finding and prognosis. Recommend evaluation and management with retina service.     Diabetes  Retinal hemorrhage, left eye  -HbA1c= 6.5 (8/20/24). Few hemorrhages, OS, no macular edema. Continue close monitoring of blood glucose, blood pressure, and cholesterol. Plan for annual dilated eye exam.    Pseudophakia  -Stable. Good vision.     Hyperopia  Astigmatism  Presbyopia  -New Rx given per patient request.     No FH of AMD

## 2024-09-25 ENCOUNTER — APPOINTMENT (OUTPATIENT)
Dept: OPHTHALMOLOGY | Facility: CLINIC | Age: 64
End: 2024-09-25
Payer: MEDICARE

## 2024-09-25 DIAGNOSIS — E11.65 TYPE 2 DIABETES MELLITUS WITH HYPERGLYCEMIA, WITHOUT LONG-TERM CURRENT USE OF INSULIN: ICD-10-CM

## 2024-09-25 DIAGNOSIS — H52.03 HYPEROPIA, BILATERAL: ICD-10-CM

## 2024-09-25 DIAGNOSIS — H40.003 GLAUCOMA SUSPECT OF BOTH EYES: Primary | ICD-10-CM

## 2024-09-25 DIAGNOSIS — H52.203 ASTIGMATISM OF BOTH EYES, UNSPECIFIED TYPE: ICD-10-CM

## 2024-09-25 DIAGNOSIS — H35.62 RETINAL HEMORRHAGE, LEFT: ICD-10-CM

## 2024-09-25 DIAGNOSIS — H35.373 EPIRETINAL MEMBRANE (ERM) OF BOTH EYES: ICD-10-CM

## 2024-09-25 DIAGNOSIS — H52.4 PRESBYOPIA: ICD-10-CM

## 2024-09-25 DIAGNOSIS — Z96.1 PSEUDOPHAKIA: ICD-10-CM

## 2024-09-25 PROBLEM — H40.1131 PRIMARY OPEN ANGLE GLAUCOMA (POAG) OF BOTH EYES, MILD STAGE: Status: ACTIVE | Noted: 2024-09-25

## 2024-09-25 PROCEDURE — 76514 ECHO EXAM OF EYE THICKNESS: CPT | Performed by: OPHTHALMOLOGY

## 2024-09-25 PROCEDURE — 92133 CPTRZD OPH DX IMG PST SGM ON: CPT | Performed by: OPHTHALMOLOGY

## 2024-09-25 PROCEDURE — 99204 OFFICE O/P NEW MOD 45 MIN: CPT | Performed by: OPHTHALMOLOGY

## 2024-09-25 PROCEDURE — 92015 DETERMINE REFRACTIVE STATE: CPT | Performed by: OPHTHALMOLOGY

## 2024-09-25 ASSESSMENT — REFRACTION_MANIFEST
OD_ADD: +2.50
OD_AXIS: 090
OS_CYLINDER: -1.25
OS_ADD: +2.50
OS_SPHERE: +0.75
OD_CYLINDER: -1.25
OD_SPHERE: +1.25
METHOD_AUTOREFRACTION: 1
OS_CYLINDER: -1.50
OS_SPHERE: +0.50
OD_CYLINDER: -1.75
OS_AXIS: 085
OD_AXIS: 090
OS_AXIS: 085
OD_SPHERE: +1.50

## 2024-09-25 ASSESSMENT — VISUAL ACUITY
OD_SC+: -1
OD_BAT_MED: 20/25
OS_SC: 20/50
METHOD: SNELLEN - LINEAR
OD_SC: 20/40
OS_BAT_MED: 20/30-2
OS_SC+: +2

## 2024-09-25 ASSESSMENT — CONF VISUAL FIELD
OS_SUPERIOR_NASAL_RESTRICTION: 0
OS_NORMAL: 1
OS_INFERIOR_TEMPORAL_RESTRICTION: 0
OD_INFERIOR_NASAL_RESTRICTION: 0
OD_NORMAL: 1
OD_INFERIOR_TEMPORAL_RESTRICTION: 0
OS_SUPERIOR_TEMPORAL_RESTRICTION: 0
OS_INFERIOR_NASAL_RESTRICTION: 0
OD_SUPERIOR_NASAL_RESTRICTION: 0
OD_SUPERIOR_TEMPORAL_RESTRICTION: 0

## 2024-09-25 ASSESSMENT — PACHYMETRY
EXAM_DATE: 9/25/2024
OD_CT(UM): 566
OS_CT(UM): 575

## 2024-09-25 ASSESSMENT — ENCOUNTER SYMPTOMS
PSYCHIATRIC NEGATIVE: 0
ENDOCRINE NEGATIVE: 1
GASTROINTESTINAL NEGATIVE: 0
CARDIOVASCULAR NEGATIVE: 0
HEMATOLOGIC/LYMPHATIC NEGATIVE: 0
RESPIRATORY NEGATIVE: 0
CONSTITUTIONAL NEGATIVE: 0
ALLERGIC/IMMUNOLOGIC NEGATIVE: 0
EYES NEGATIVE: 1
MUSCULOSKELETAL NEGATIVE: 0
NEUROLOGICAL NEGATIVE: 0

## 2024-09-25 ASSESSMENT — TONOMETRY
OS_IOP_MMHG: 15
IOP_METHOD: GOLDMANN APPLANATION
OD_IOP_MMHG: 16

## 2024-09-25 ASSESSMENT — REFRACTION_WEARINGRX
OD_SPHERE: OTC READERS +2.00
OS_SPHERE: OTC READERS +2.00

## 2024-09-25 ASSESSMENT — CUP TO DISC RATIO
OS_RATIO: 0.25
OD_RATIO: 0.25

## 2024-09-25 ASSESSMENT — SLIT LAMP EXAM - LIDS
COMMENTS: GOOD POSITION, DERMATOCHALASIS
COMMENTS: GOOD POSITION, DERMATOCHALASIS

## 2024-09-26 ENCOUNTER — APPOINTMENT (OUTPATIENT)
Dept: ALLERGY | Facility: CLINIC | Age: 64
End: 2024-09-26
Payer: MEDICARE

## 2024-09-26 DIAGNOSIS — R09.81 CONGESTION OF NASAL SINUS: ICD-10-CM

## 2024-09-26 DIAGNOSIS — J31.0 CHRONIC RHINITIS: Primary | ICD-10-CM

## 2024-09-26 DIAGNOSIS — H10.13 ALLERGIC CONJUNCTIVITIS OF BOTH EYES: ICD-10-CM

## 2024-09-26 PROCEDURE — 95004 PERQ TESTS W/ALRGNC XTRCS: CPT | Performed by: ALLERGY & IMMUNOLOGY

## 2024-09-26 PROCEDURE — 99204 OFFICE O/P NEW MOD 45 MIN: CPT | Performed by: ALLERGY & IMMUNOLOGY

## 2024-09-26 NOTE — PROGRESS NOTES
Subjective   Patient ID:   [id number]   Dinah Canales is a 64 y.o. female who presents for Allergic Rhinitis and Sinusitis.    Chief Complaint   Patient presents with    Allergic Rhinitis    Sinusitis       HPI  This patient is here to evaluate for:    Spring and Fall sinus infections requiring antibiotic 2x/year  This year, happened twice so sent by her PCP for Allergy evaluation.   Green drainage, pressure forehead/max sinus, coughing  Waits a couple weeks before taking antibiotic   Mucinex helps the coughing that keeps her up at night.     Flonase,  cetirizine  Montelukast - not helping  Still constant sneezing, blowing her nose.     Nasal congestion:  yes, even when not having sinus infections  Anterior/posterior mucopurulent nasal drainage:  ant and mostly in the posterior  Sense of smell and taste (hyposmia/anosmia):  good  Sinus pressure, pain, fullness: no currently  Cough: no   Sinus infections are reported as occurring x per year:   and have been treated with # antibiotics in the past 12 months:  2  Sinus infections consist of purulent discharge: yes  facial pain: yes  symptoms lasting over a week:  yes  Throbbing pain: no  Throat clearing: yes  Imaging of the sinuses:   Sinus surgery:  no  Strong smells bother this patient:  no  Aspirin allergy: no; ok with ibuprofen also.    Albuterol use if rare; only in hot.     Pmxh: diabetes type 2     Penicillin allergy - on day 3, she developed facial swelling and full body hives. Described shortness of breath also. Was kept at [facility] overnight.  Approx. 20 years ago.     Sh: quit job at [employer] for 26 years, had no insurance for 3 years and now has Medicare    Review of Systems   All other systems reviewed and are negative.      Saw eye doctor yesterday. S/p cataract and glaucoma. Retinal bleeding; needs to see a specialist     Objective     There were no vitals taken for this visit.     Physical Exam  Constitutional:       General: She is not in acute  distress.     Appearance: Normal appearance. She is not ill-appearing.   HENT:      Head: Normocephalic and atraumatic.      Right Ear: Tympanic membrane, ear canal and external ear normal.      Left Ear: Tympanic membrane, ear canal and external ear normal.      Nose: Nose normal. No congestion or rhinorrhea.      Mouth/Throat:      Mouth: Mucous membranes are moist.      Pharynx: Oropharynx is clear. No oropharyngeal exudate or posterior oropharyngeal erythema.   Eyes:      General:         Right eye: No discharge.         Left eye: No discharge.      Conjunctiva/sclera: Conjunctivae normal.   Cardiovascular:      Rate and Rhythm: Normal rate and regular rhythm.      Heart sounds: Normal heart sounds. No murmur heard.     No friction rub. No gallop.   Pulmonary:      Effort: Pulmonary effort is normal. No respiratory distress.      Breath sounds: Normal breath sounds. No stridor. No wheezing, rhonchi or rales.   Chest:      Chest wall: No tenderness.   Abdominal:      General: Abdomen is flat.      Palpations: Abdomen is soft.   Musculoskeletal:         General: Normal range of motion.      Cervical back: Normal range of motion and neck supple.   Lymphadenopathy:      Cervical: No cervical adenopathy.   Skin:     General: Skin is warm and dry.      Findings: No erythema, lesion or rash.   Neurological:      General: No focal deficit present.      Mental Status: She is alert. Mental status is at baseline.   Psychiatric:         Mood and Affect: Mood normal.         Behavior: Behavior normal.         Thought Content: Thought content normal.         Judgment: Judgment normal.            Current Outpatient Medications   Medication Sig Dispense Refill    albuterol 90 mcg/actuation inhaler Inhale 2 puffs every 4 hours if needed for wheezing or shortness of breath. 18 g 11    atorvastatin (Lipitor) 20 mg tablet Take 1 tablet (20 mg) by mouth once daily. 90 tablet 1    buPROPion XL (Wellbutrin XL) 150 mg 24 hr tablet Take  1 tablet (150 mg) by mouth once daily. 90 tablet 1    cyanocobalamin, vitamin B-12, (Vitamin B-12) 1,000 mcg tablet extended release Take 1 tablet (1,000 mcg) by mouth once daily.      dulaglutide (Trulicity) 0.75 mg/0.5 mL pen injector Inject 0.75 mg under the skin 1 (one) time per week. Do not fill before September 16, 2024. 2 mL 4    fluticasone (Flonase) 50 mcg/actuation nasal spray Administer 1 spray into each nostril once daily. Shake gently. Before first use, prime pump. After use, clean tip and replace cap. 16 g 5    FreeStyle Martin 3 [place] misc USE AS DIRECTED ONCE DAILY 1 each 0    FreeStyle Martin 3 Sensor device Use as directed to check blood sugar once per day. 5 each 2    furosemide (Lasix) 20 mg tablet Take 1 tablet (20 mg) by mouth once daily as needed. FOR LEG SWELLING      levothyroxine (Synthroid, Levoxyl) 112 mcg tablet Take 1 tab PO daily except 2 tabs every Saturday 120 tablet 1    lisinopril 40 mg tablet Take 1 tablet (40 mg) by mouth once daily. 90 tablet 1    metFORMIN (Glucophage) 1,000 mg tablet Take 1 tablet (1,000 mg) by mouth 2 times a day. 180 tablet 1    montelukast (Singulair) 10 mg tablet Take 1 tablet (10 mg) by mouth once daily at bedtime. 90 tablet 1     No current facility-administered medications for this visit.       Summary of the labs over the past 6 months:    Procedure Visit on 08/23/2024   Component Date Value Ref Range Status    Case Report 08/23/2024    Final                    Value:Gynecologic Cytology                              Case: [id number]                                   Authorizing Provider:  Cecelia La DO   Collected:           08/23/2024 0909              Ordering Location:     [facility] Received:            08/23/2024 0914              First Screen:          [facility]IN[facility] Mina                                                          Specimen:    ThinPrep Liquid-Based Pap-Imaging System Screen, CERVIX, SCREENING                          Final Cytological Interpretation 08/23/2024    Final                    Value:    A. THINPREP PAP CERVIX, SCREENING -     Specimen Adequacy  Satisfactory for evaluation; endocervical/transformation zone component is present    General Categorization  Negative for intraepithelial lesion or malignancy.    Descriptive Interpretation  Negative for intraepithelial lesion or malignancy              08/23/2024    Final                    Value:Slide(s) initially screened by NINFA Mina at [address] [id number]  By the signature on this report, the individual or group listed as making the Final Interpretation/Diagnosis certifies that they have reviewed this case.       ThinPrep Imaging System 08/23/2024    Final                    Value:This specimen has been analyzed by the [employer] Bi[employer]Prep Imaging System ([employer] an automated imaging and review system, which assists the laboratory in evaluating cells on ThinPrep Pap tests. Following automated imaging, selected fields from every slide were reviewed by a cytotechnologist and/or pathologist.        Educational Note 08/23/2024    Final                    Value:Cervical cytology is a screening procedure primarily for squamous cancers and precursors and has associated false-negative and false-positives results as evidenced by published data. Your patient's test should be interpreted in this context, together with the patient's history and clinical findings. Regular sampling and follow-up of unexplained clinical signs and symptoms are recommended to minimize false negative results.      Perform HPV HR test? 08/23/2024 Always (all interpretations)   Final    Include HPV Genotype? 08/23/2024 Yes   Final    Menstrual status 08/23/2024    Final                    Value:Post-menopausal      HPV, high-risk 08/23/2024 Negative  Negative Final    HPV Type 16 DNA 08/23/2024 Negative  Negative Final    HPV Type 18 DNA 08/23/2024  Negative  Negative Final    HPV non-Type 16 or 18 DNA 08/23/2024 Negative  Negative Final   Lab on 08/20/2024   Component Date Value Ref Range Status    WBC 08/20/2024 15.8 (H)  4.4 - 11.3 x10*3/uL Final    nRBC 08/20/2024 0.0  0.0 - 0.0 /100 WBCs Final    RBC 08/20/2024 4.60  4.00 - 5.20 x10*6/uL Final    Hemoglobin 08/20/2024 13.3  12.0 - 16.0 g/dL Final    Hematocrit 08/20/2024 41.7  36.0 - 46.0 % Final    MCV 08/20/2024 91  80 - 100 fL Final    MCH 08/20/2024 28.9  26.0 - 34.0 pg Final    MCHC 08/20/2024 31.9 (L)  32.0 - 36.0 g/dL Final    RDW 08/20/2024 13.5  11.5 - 14.5 % Final    Platelets 08/20/2024 293  150 - 450 x10*3/uL Final    Neutrophils % 08/20/2024 76.3  40.0 - 80.0 % Final    Immature Granulocytes %, Automated 08/20/2024 0.4  0.0 - 0.9 % Final    Lymphocytes % 08/20/2024 14.5  13.0 - 44.0 % Final    Monocytes % 08/20/2024 5.4  2.0 - 10.0 % Final    Eosinophils % 08/20/2024 2.8  0.0 - 6.0 % Final    Basophils % 08/20/2024 0.6  0.0 - 2.0 % Final    Neutrophils Absolute 08/20/2024 12.02 (H)  1.20 - 7.70 x10*3/uL Final    Immature Granulocytes Absolute, Au* 08/20/2024 0.06  0.00 - 0.70 x10*3/uL Final    Lymphocytes Absolute 08/20/2024 2.29  1.20 - 4.80 x10*3/uL Final    Monocytes Absolute 08/20/2024 0.85  0.10 - 1.00 x10*3/uL Final    Eosinophils Absolute 08/20/2024 0.44  0.00 - 0.70 x10*3/uL Final    Basophils Absolute 08/20/2024 0.09  0.00 - 0.10 x10*3/uL Final    Glucose 08/20/2024 109 (H)  74 - 99 mg/dL Final    Sodium 08/20/2024 140  136 - 145 mmol/L Final    Potassium 08/20/2024 5.1  3.5 - 5.3 mmol/L Final    Chloride 08/20/2024 105  98 - 107 mmol/L Final    Bicarbonate 08/20/2024 29  21 - 32 mmol/L Final    Anion Gap 08/20/2024 11  10 - 20 mmol/L Final    Urea Nitrogen 08/20/2024 15  6 - 23 mg/dL Final    Creatinine 08/20/2024 0.84  0.50 - 1.05 mg/dL Final    eGFR 08/20/2024 77  >60 mL/min/1.73m*2 Final    Calcium 08/20/2024 9.7  8.6 - 10.3 mg/dL Final    Albumin 08/20/2024 3.9  3.4 - 5.0 g/dL  Final    Alkaline Phosphatase 08/20/2024 74  33 - 136 U/L Final    Total Protein 08/20/2024 6.8  6.4 - 8.2 g/dL Final    AST 08/20/2024 10  9 - 39 U/L Final    Bilirubin, Total 08/20/2024 0.3  0.0 - 1.2 mg/dL Final    ALT 08/20/2024 15  7 - 45 U/L Final    Hemoglobin A1C 08/20/2024 6.5 (H)  see below % Final    Estimated Average Glucose 08/20/2024 140  Not Established mg/dL Final    Vitamin D, 25-Hydroxy, Total 08/20/2024 43  30 - 100 ng/mL Final    Thyroid Stimulating Hormone 08/20/2024 1.39  0.44 - 3.98 mIU/L Final   Hospital Outpatient Visit on 05/22/2024   Component Date Value Ref Range Status    POCT Glucose 05/22/2024 95  74 - 99 mg/dL Final    Case Report 05/22/2024    Final                    Value:Surgical Pathology                                Case: [id number]                                  Authorizing Provider:  Irma Briones MD        Collected:           05/22/2024 1015              Ordering Location:     [facility]  Received:            05/22/2024 1049                                     OR                                                                           Pathologist:           Martinez Scanlon MD                                                         Specimens:   A) - COLON -CECUM POLYP, X 3                                                                        B) - COLON - TRANSVERSE POLYP                                                                       C) - RECTUM POLYPECTOMY, X 2                                                               FINAL DIAGNOSIS 05/22/2024    Final                    Value:                          A.  POLYPS X3 (CECUM), EXCISIONAL BIOPSIES:                           - ADENOMATOUS POLYPS (TUBULAR ADENOMAS).                                                    B.  POLYP (TRANSVERSE COLON), EXCISIONAL BIOPSIES:                           - ADENOMATOUS POLYP (TUBULAR ADENOMA).                                                    C.  POLYPS X2  (RECTUM), EXCISIONAL BIOPSIES:                           - ONE ADENOMATOUS POLYP (TUBULAR ADENOMA) AND ONE HYPERPLASTIC POLYP.                                05/22/2024    Final                    Value:By the signature on this report, the individual or group listed as making                           the Final Interpretation/Diagnosis certifies that they have reviewed this                           case.     Clinical History 05/22/2024    Final                    Value:Colon cancer screening Z12.11    Gross Description 05/22/2024    Final                    Value:A: Received in formalin, labeled with the patient's name and hospital                           number, are multiple fragments of tan, soft tissue aggregating to 0.6 x                           0.5 x 0.2 cm. The specimen is submitted in toto in 2 cassettes.                          AE                          B: Received in formalin, labeled with the patient's name and hospital                           number, are multiple fragments of tan, soft tissue aggregating to 0.4 x                           0.3 x 0.2 cm. The specimen is submitted in toto in one cassette.                          AE                          C: Received in formalin, labeled with the patient's name and hospital                           number, are multiple fragments of tan, soft tissue aggregating to 0.6 x                           0.6 x 0.2 cm. The specimen is submitted in toto in one cassette.                          AE    Microscopic Description 05/22/2024    Final                    Value:Microscopic slides examined.   Lab on 05/17/2024   Component Date Value Ref Range Status    Cholesterol 05/17/2024 142  0 - 199 mg/dL Final    HDL-Cholesterol 05/17/2024 42.5  mg/dL Final    Cholesterol/HDL Ratio 05/17/2024 3.3   Final    LDL Calculated 05/17/2024 74  <=99 mg/dL Final    VLDL 05/17/2024 25  0 - 40 mg/dL Final    Triglycerides 05/17/2024 126  0 - 149 mg/dL Final    Non HDL  Cholesterol 05/17/2024 100  0 - 149 mg/dL Final    Glucose 05/17/2024 83  74 - 99 mg/dL Final    Sodium 05/17/2024 140  136 - 145 mmol/L Final    Potassium 05/17/2024 4.6  3.5 - 5.3 mmol/L Final    Chloride 05/17/2024 103  98 - 107 mmol/L Final    Bicarbonate 05/17/2024 29  21 - 32 mmol/L Final    Anion Gap 05/17/2024 13  10 - 20 mmol/L Final    Urea Nitrogen 05/17/2024 9  6 - 23 mg/dL Final    Creatinine 05/17/2024 0.84  0.50 - 1.05 mg/dL Final    eGFR 05/17/2024 77  >60 mL/min/1.73m*2 Final    Calcium 05/17/2024 9.2  8.6 - 10.3 mg/dL Final    Albumin 05/17/2024 3.7  3.4 - 5.0 g/dL Final    Alkaline Phosphatase 05/17/2024 67  33 - 136 U/L Final    Total Protein 05/17/2024 6.3 (L)  6.4 - 8.2 g/dL Final    AST 05/17/2024 15  9 - 39 U/L Final    Bilirubin, Total 05/17/2024 0.4  0.0 - 1.2 mg/dL Final    ALT 05/17/2024 20  7 - 45 U/L Final    Hemoglobin A1C 05/17/2024 6.1 (H)  see below % Final    Estimated Average Glucose 05/17/2024 128  Not Established mg/dL Final    Thyroid Stimulating Hormone 05/17/2024 1.98  0.44 - 3.98 mIU/L Final    Hepatitis C AB 05/17/2024 Nonreactive  Nonreactive Final    HIV 1/2 Antigen/Antibody Screen wi* 05/17/2024 Nonreactive  Nonreactive Final    Vitamin D, 25-Hydroxy, Total 05/17/2024 16 (L)  30 - 100 ng/mL Final   Hospital Outpatient Visit on 04/23/2024   Component Date Value Ref Range Status    Ventricular Rate 04/23/2024 56  BPM Final    Atrial Rate 04/23/2024 57  BPM Final    AR Interval 04/23/2024 148  ms Final    QRS Duration 04/23/2024 98  ms Final    QT Interval 04/23/2024 400  ms Final    QTC Calculation(Bazett) 04/23/2024 387  ms Final    P Axis 04/23/2024 48  degrees Final    R Axis 04/23/2024 58  degrees Final    T Axis 04/23/2024 -2  degrees Final    QRS Count 04/23/2024 9  beats Final    Q Onset 04/23/2024 253  ms Final    T Offset 04/23/2024 453  ms Final    QTC Fredericia 04/23/2024 391  ms Final   Pre-Admission Testing on 04/23/2024   Component Date Value Ref Range  Status    Glucose 04/23/2024 76  74 - 99 mg/dL Final    Sodium 04/23/2024 140  136 - 145 mmol/L Final    Potassium 04/23/2024 5.0  3.5 - 5.3 mmol/L Final    Chloride 04/23/2024 107  98 - 107 mmol/L Final    Bicarbonate 04/23/2024 24  21 - 32 mmol/L Final    Anion Gap 04/23/2024 14  10 - 20 mmol/L Final    Urea Nitrogen 04/23/2024 20  6 - 23 mg/dL Final    Creatinine 04/23/2024 0.86  0.50 - 1.05 mg/dL Final    eGFR 04/23/2024 75  >60 mL/min/1.73m*2 Final    Calcium 04/23/2024 9.2  8.6 - 10.3 mg/dL Final    WBC 04/23/2024 9.0  4.4 - 11.3 x10*3/uL Final    nRBC 04/23/2024 0.0  0.0 - 0.0 /100 WBCs Final    RBC 04/23/2024 4.72  4.00 - 5.20 x10*6/uL Final    Hemoglobin 04/23/2024 13.3  12.0 - 16.0 g/dL Final    Hematocrit 04/23/2024 42.4  36.0 - 46.0 % Final    MCV 04/23/2024 90  80 - 100 fL Final    MCH 04/23/2024 28.2  26.0 - 34.0 pg Final    MCHC 04/23/2024 31.4 (L)  32.0 - 36.0 g/dL Final    RDW 04/23/2024 14.2  11.5 - 14.5 % Final    Platelets 04/23/2024 261  150 - 450 x10*3/uL Final     SCRATCH SKIN TESTING:  No allergies to dust mites, molds, pollens, cats, or dogs.     Assessment/Plan   Diagnoses and all orders for this visit:  Chronic rhinitis  Allergic conjunctivitis of both eyes  -     Referral to Allergy  Congestion of nasal sinus  -     Referral to Allergy    We performed allergy testing to help determine the etiology of your symptoms. We discussed the results of the testing. Also, we started to focus on treating your problem and we reviewed the management plan.    The allergy testing was negative to common environmental allergy triggers. This means that IgE-mediated allergy is not causing your symptoms.     Continue to use the nasal fluticasone medication and cetirizine. Montelukast can likely be stopped since it is not helping and there are no allergies.      We discussed starting nasal saline rinses. You want to use distilled water to the salt packets. Make sure not to blow your nose out hard; also do not  cover either nostril when blowing. Many people do this in the shower, then take your shower, and afterwards you may use your nasal sprays.     I would be happy to see you again as needed. Please feel free to contact my office to schedule a follow-up with our office at [phone].     Adrian Castro MD

## 2024-10-03 NOTE — PROGRESS NOTES
Pharmacist Clinic: Diabetes Management  Dinah Canales is a 64 y.o. female was referred to Clinical Pharmacy Team for diabetes management.   Referring Provider: Cecelia La, *  - Last visit with referring provider: 8/23/24     Patient Assistance for TRULICITY approved through 8/2/2025. Will have to be renewed prior to that date to prevent lapse in coverage. Medication(s) will be received at no cost to patient from [facility].     Subjective     HPI    Current Diabetes Pharmacotherapy:    - Metformin 1000 mg BID   Takes PM dose 10 pm - switch to 6 pm  - Trulicity 0.75 mg weekly - Saturdays     Social History:  Current diet:   - 2 meals a day  - Does not eat that much   - Does not eat sweets  - Fruits  - B: Scrambled eggs with veggies  - D: Chicken, fish, steak   - Lost 30 lbs on Trulicity  - Does not eat past 6 pm  - Does not like potatoes    Current exercise:   - Runs around taking care of 13 grandchildren   - Usually doing a lot of movement at night; vacuum, cleaning, putting toys away    Eye exam for this year?: no - September 2024  Foot exam for this year?: no     Current monitoring regimen:   Patient is using: continuous glucose monitor  Type of CGM: Martin 3    Reported blood sugars:   APG Report is below   - Sensors have been popping off or not working; states she is putting on correctly; coming off with covers on too; states they look bent    Any episodes of hypoglycemia? Yes   - Does not eat any food after 6 pm   - < 60-70 will feel symptoms: dizzy/funny feeling  - Mostly around morning   - Tried PB but spiked    Adverse Effects: None         Objective     There were no vitals taken for this visit.    Allergies   Allergen Reactions    Penicillins Other, Hives and Palpitations     Anaphylactic reaction    Ciprofloxacin Other     Severe  shoulder pain       Historical Diabetes Pharmacotherapy:  - Trulicity (costly)  - Jardiance (yeast infections)  - Farxiga (costly but tolerated)      SECONDARY PREVENTION  - Statin? Yes   - ACE-I/ARB? Yes  - Aspirin? No    Pertinent PMH Review:  - PMH of Pancreatitis: No  - PMH of Retinopathy: No  - PMH of Urinary Tract Infections: No  - PMH of Yeast Infections: No  - PMH of MTC: No    Lab Review  Lab Results   Component Value Date    BILITOT 0.3 08/20/2024    CALCIUM 9.7 08/20/2024    CO2 29 08/20/2024     08/20/2024    CREATININE 0.84 08/20/2024    GLUCOSE 109 (H) 08/20/2024    ALKPHOS 74 08/20/2024    K 5.1 08/20/2024    PROT 6.8 08/20/2024     08/20/2024    AST 10 08/20/2024    ALT 15 08/20/2024    BUN 15 08/20/2024    ANIONGAP 11 08/20/2024    ALBUMIN 3.9 08/20/2024    GFRF 72 01/24/2023     Lab Results   Component Value Date    TRIG 126 05/17/2024    CHOL 142 05/17/2024    HDL 42.5 05/17/2024     Lab Results   Component Value Date    HGBA1C 6.5 (H) 08/20/2024    HGBA1C 6.1 (H) 05/17/2024    HGBA1C 7.0 (H) 02/16/2024     The 10-year ASCVD risk score (Andria MC, et al., 2019) is: 13%    Values used to calculate the score:      Age: 64 years      Sex: Female      Is Non- : No      Diabetic: Yes      Tobacco smoker: No      Systolic Blood Pressure: 128 mmHg      Is BP treated: Yes      HDL Cholesterol: 42.5 mg/dL      Total Cholesterol: 142 mg/dL    Drug Interactions:  None    Affordability[facility]Accessibility[facility]  [facility]    [facility]    Assessment/Plan   Problem List Items Addressed This Visit       Type 2 diabetes mellitus (Chronic)    Relevant Orders    Follow Up In Clinical Pharmacy       ASSESSMENT:  Patients diabetes is controlled with most recent A1c of 6.5%.     Patient's blood sugars running at goal. Still having some morning lows. Believe this is from her doing a lot of movement at night before bed. At this time, we will decrease Metformin to 1/2 tablet at bedtime. I told her to take this with her dinner. She is also having issues with getting her sensor to stay on. We discussed some ideas  to try help for it to stay on; she is going to try adhesive wipes to make the skin more sticky.    PLAN:  DECREASE Metformin to 1 tablet in the morning and 1/2 tablet in the afternoon  CONTINUE Trulicity  Follow up with clinical pharmacist: 11/1/24 @ 10   Follow up with PCP: 2/24/24    Thank you,  Tiesha Griffin, PharmD  Clinical Pharmacy Specialist  [phone]  [email or web address]     Continue all meds under the continuation of care with the referring provider and clinical pharmacy team.

## 2024-10-04 ENCOUNTER — APPOINTMENT (OUTPATIENT)
Dept: PHARMACY | Facility: HOSPITAL | Age: 64
End: 2024-10-04
Payer: MEDICARE

## 2024-10-04 DIAGNOSIS — E11.65 TYPE 2 DIABETES MELLITUS WITH HYPERGLYCEMIA, WITHOUT LONG-TERM CURRENT USE OF INSULIN: Chronic | ICD-10-CM

## 2024-10-09 PROCEDURE — RXMED WILLOW AMBULATORY MEDICATION CHARGE

## 2024-10-12 ENCOUNTER — PHARMACY VISIT (OUTPATIENT)
Dept: PHARMACY | Facility: CLINIC | Age: 64
End: 2024-10-12
Payer: MEDICARE

## 2024-10-18 DIAGNOSIS — E11.65 TYPE 2 DIABETES MELLITUS WITH HYPERGLYCEMIA, WITHOUT LONG-TERM CURRENT USE OF INSULIN: Chronic | ICD-10-CM

## 2024-10-18 NOTE — TELEPHONE ENCOUNTER
Pt msg via [facility] states she is out of refills on pended med.  OK for refill?  Next OV 2/24/25. Thanks, CG

## 2024-10-21 RX ORDER — BLOOD-GLUCOSE SENSOR
EACH MISCELLANEOUS
Qty: 5 EACH | Refills: 5 | Status: SHIPPED | OUTPATIENT
Start: 2024-10-21

## 2024-11-01 ENCOUNTER — APPOINTMENT (OUTPATIENT)
Dept: PHARMACY | Facility: HOSPITAL | Age: 64
End: 2024-11-01
Payer: MEDICARE

## 2024-11-01 DIAGNOSIS — E11.65 TYPE 2 DIABETES MELLITUS WITH HYPERGLYCEMIA, WITHOUT LONG-TERM CURRENT USE OF INSULIN: Chronic | ICD-10-CM

## 2024-11-01 PROCEDURE — RXMED WILLOW AMBULATORY MEDICATION CHARGE

## 2024-11-01 RX ORDER — HYDROCHLOROTHIAZIDE 12.5 MG/1
CAPSULE ORAL
Qty: 2 EACH | Refills: 11 | Status: SHIPPED | OUTPATIENT
Start: 2024-11-01

## 2024-11-05 ENCOUNTER — PHARMACY VISIT (OUTPATIENT)
Dept: PHARMACY | Facility: CLINIC | Age: 64
End: 2024-11-05
Payer: MEDICARE

## 2024-11-06 PROCEDURE — RXMED WILLOW AMBULATORY MEDICATION CHARGE

## 2024-11-07 ENCOUNTER — PHARMACY VISIT (OUTPATIENT)
Dept: PHARMACY | Facility: CLINIC | Age: 64
End: 2024-11-07
Payer: MEDICARE

## 2024-11-15 ENCOUNTER — APPOINTMENT (OUTPATIENT)
Dept: OPHTHALMOLOGY | Facility: CLINIC | Age: 64
End: 2024-11-15
Payer: MEDICARE

## 2024-11-15 DIAGNOSIS — E11.3393 MODERATE NONPROLIFERATIVE DIABETIC RETINOPATHY OF BOTH EYES WITHOUT MACULAR EDEMA ASSOCIATED WITH TYPE 2 DIABETES MELLITUS: ICD-10-CM

## 2024-11-15 DIAGNOSIS — H35.373 EPIRETINAL MEMBRANE (ERM) OF BOTH EYES: Primary | ICD-10-CM

## 2024-11-15 PROBLEM — E11.3399 MODERATE NONPROLIFERATIVE DIABETIC RETINOPATHY WITHOUT MACULAR EDEMA ASSOCIATED WITH TYPE 2 DIABETES MELLITUS: Status: ACTIVE | Noted: 2024-11-15

## 2024-11-15 PROBLEM — H35.341 LAMELLAR MACULAR HOLE OF RIGHT EYE: Status: ACTIVE | Noted: 2024-11-15

## 2024-11-15 PROCEDURE — 92134 CPTRZ OPH DX IMG PST SGM RTA: CPT

## 2024-11-15 PROCEDURE — 99213 OFFICE O/P EST LOW 20 MIN: CPT

## 2024-11-15 ASSESSMENT — CUP TO DISC RATIO
OD_RATIO: 0.25
OS_RATIO: 0.25

## 2024-11-15 ASSESSMENT — VISUAL ACUITY
OD_SC: 20/30
METHOD: SNELLEN - LINEAR
OD_SC+: -1
OS_SC: 20/40

## 2024-11-15 ASSESSMENT — TONOMETRY
OD_IOP_MMHG: 15
OS_IOP_MMHG: 15
IOP_METHOD: GOLDMANN APPLANATION

## 2024-11-15 ASSESSMENT — PACHYMETRY
OD_CT(UM): 566
EXAM_DATE: 9/25/2024
OS_CT(UM): 575

## 2024-11-15 ASSESSMENT — ENCOUNTER SYMPTOMS: EYES NEGATIVE: 1

## 2024-11-15 ASSESSMENT — SLIT LAMP EXAM - LIDS
COMMENTS: GOOD POSITION, DERMATOCHALASIS
COMMENTS: GOOD POSITION, DERMATOCHALASIS

## 2024-11-15 ASSESSMENT — EXTERNAL EXAM - RIGHT EYE: OD_EXAM: NORMAL

## 2024-11-15 ASSESSMENT — EXTERNAL EXAM - LEFT EYE: OS_EXAM: NORMAL

## 2024-11-15 NOTE — PROGRESS NOTES
Epiretinal membrane, bilateral  Lamellar Macular Hole - Right Eye  -new patient referred by Dr. 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 Few hemorrhages, OS, no macular edema. Continue close monitoring of blood glucose, blood pressure, and cholesterol. Plan for annual dilated eye exam.    Glaucoma suspect, bilateral  -(+)FH glaucoma - son  -History of using latanoprost OU QHS for elevated IOP. Stopped using after CEIOL/MIGS (stent), done OU in 2021.   - Followed by Dr. Marcum     Pseudophakia  -Stable. Good vision.

## 2024-12-03 ENCOUNTER — OFFICE VISIT (OUTPATIENT)
Dept: PRIMARY CARE | Facility: CLINIC | Age: 64
End: 2024-12-03
Payer: MEDICARE

## 2024-12-03 VITALS
OXYGEN SATURATION: 99 % | DIASTOLIC BLOOD PRESSURE: 84 MMHG | SYSTOLIC BLOOD PRESSURE: 132 MMHG | TEMPERATURE: 97.8 F | HEART RATE: 53 BPM

## 2024-12-03 DIAGNOSIS — E11.65 TYPE 2 DIABETES MELLITUS WITH HYPERGLYCEMIA, WITHOUT LONG-TERM CURRENT USE OF INSULIN: Chronic | ICD-10-CM

## 2024-12-03 DIAGNOSIS — L50.9 HIVES: ICD-10-CM

## 2024-12-03 DIAGNOSIS — R05.1 ACUTE COUGH: Primary | ICD-10-CM

## 2024-12-03 PROCEDURE — 3044F HG A1C LEVEL LT 7.0%: CPT | Performed by: FAMILY MEDICINE

## 2024-12-03 PROCEDURE — 87634 RSV DNA/RNA AMP PROBE: CPT

## 2024-12-03 PROCEDURE — 3075F SYST BP GE 130 - 139MM HG: CPT | Performed by: FAMILY MEDICINE

## 2024-12-03 PROCEDURE — 1036F TOBACCO NON-USER: CPT | Performed by: FAMILY MEDICINE

## 2024-12-03 PROCEDURE — 87636 SARSCOV2 & INF A&B AMP PRB: CPT

## 2024-12-03 PROCEDURE — 3048F LDL-C <100 MG/DL: CPT | Performed by: FAMILY MEDICINE

## 2024-12-03 PROCEDURE — 3079F DIAST BP 80-89 MM HG: CPT | Performed by: FAMILY MEDICINE

## 2024-12-03 PROCEDURE — 1159F MED LIST DOCD IN RCRD: CPT | Performed by: FAMILY MEDICINE

## 2024-12-03 PROCEDURE — G2211 COMPLEX E/M VISIT ADD ON: HCPCS | Performed by: FAMILY MEDICINE

## 2024-12-03 PROCEDURE — 4010F ACE/ARB THERAPY RXD/TAKEN: CPT | Performed by: FAMILY MEDICINE

## 2024-12-03 PROCEDURE — 99214 OFFICE O/P EST MOD 30 MIN: CPT | Performed by: FAMILY MEDICINE

## 2024-12-03 RX ORDER — BENZONATATE 100 MG/1
100 CAPSULE ORAL 3 TIMES DAILY PRN
Qty: 30 CAPSULE | Refills: 0 | Status: SHIPPED | OUTPATIENT
Start: 2024-12-03 | End: 2025-01-02

## 2024-12-03 RX ORDER — BLOOD-GLUCOSE SENSOR
EACH MISCELLANEOUS
Qty: 5 EACH | Refills: 3 | Status: SHIPPED | OUTPATIENT
Start: 2024-12-03

## 2024-12-03 RX ORDER — BLOOD-GLUCOSE,RECEIVER,CONT
EACH MISCELLANEOUS
Qty: 1 EACH | Refills: 0 | Status: SHIPPED | OUTPATIENT
Start: 2024-12-03

## 2024-12-03 ASSESSMENT — ENCOUNTER SYMPTOMS
NAUSEA: 0
FEVER: 1
SORE THROAT: 0
PALPITATIONS: 0
WHEEZING: 0
SHORTNESS OF BREATH: 0
COUGH: 1
FATIGUE: 1
VOMITING: 0
DIARRHEA: 0

## 2024-12-03 NOTE — PROGRESS NOTES
Subjective   Patient ID: Dinah Canales is a 64 y.o. female who presents for Hives, Itching, Cough (Dry cough), Fever, and Fatigue (X 5 days///COVID collected).    Dinah started getting sick on Thanksgiving.  She initially developed hives on her arms and legs and back.  No new foods, soaps. She took Benadryl which helped alleviate the itching.  She also developed congestion and a dry cough.  All of her family is around for [place] and there are multiple kids in the family who have been sick recently.  She has been running a low-grade fever.  She has not had any sinus pressure, sore throat or ear pain.  No nausea vomiting or diarrhea [place].    She is also having difficulty with the freestyle aisha staying on.  Has tried using different wraps and adhesive coverings, but she has not had any success.         Review of Systems   Constitutional:  Positive for fatigue and fever.   HENT:  Positive for congestion. Negative for ear pain and sore throat.    Respiratory:  Positive for cough. Negative for shortness of breath and wheezing.    Cardiovascular:  Negative for chest pain, palpitations and leg swelling.   Gastrointestinal:  Negative for diarrhea, nausea and vomiting.   Skin:  Positive for rash.       Objective   /84   Pulse 53   Temp 36.6 °C (97.8 °F)   SpO2 99%     Physical Exam  Constitutional:       General: She is not in acute distress.     Appearance: She is not toxic-appearing.   HENT:      Right Ear: Tympanic membrane normal.      Left Ear: Tympanic membrane normal.      Nose: Congestion and rhinorrhea present.   Cardiovascular:      Rate and Rhythm: Normal rate and regular rhythm.      Heart sounds: No murmur heard.  Pulmonary:      Effort: No respiratory distress.      Breath sounds: No wheezing or rales.   Skin:     Comments: Hives resolved   Neurological:      Mental Status: She is alert.         Assessment/Plan   Diagnoses and all orders for this visit:  Acute cough  Comments:  RSV, COVID and flu  testing sent.  Start benzonatate.  Consider antibiotics if symptoms persist later in the week and testing negative.  Orders:  -     Sars-CoV-2 PCR; Future  -     Influenza A, and B PCR; Future  -     RSV PCR; Future  -     benzonatate (Tessalon) 100 mg capsule; Take 1 capsule (100 mg) by mouth 3 times a day as needed for cough. Do not crush or chew.  Hives  Comments:  Suspect due to viral illness; await COVID result. Continue Benadryl as needed.  Type 2 diabetes mellitus with hyperglycemia, without long-term current use of insulin  Comments:  Switch from Freestyle Martin to DEXCOM since issues with Martin staying on arm.  Orders:  -     Dexcom G7 Sensor device; Use as directed to check blood sugar daily.  -     Dexcom G7  misc; Use as instructed to check blood sugar daily

## 2024-12-04 ENCOUNTER — TELEPHONE (OUTPATIENT)
Dept: PRIMARY CARE | Facility: CLINIC | Age: 64
End: 2024-12-04
Payer: MEDICARE

## 2024-12-04 LAB
FLUAV RNA RESP QL NAA+PROBE: NOT DETECTED
FLUBV RNA RESP QL NAA+PROBE: NOT DETECTED
RSV RNA RESP QL NAA+PROBE: NOT DETECTED
SARS-COV-2 RNA RESP QL NAA+PROBE: NOT DETECTED

## 2024-12-04 NOTE — TELEPHONE ENCOUNTER
Called pt and pt informed of provider message.  Pt states hives are not worse, they are the same.  Pt states will cb if needed

## 2024-12-05 NOTE — PROGRESS NOTES
Pharmacist Clinic: Diabetes Management  Dinah Canales is a 64 y.o. female was referred to Clinical Pharmacy Team for diabetes management.   Referring Provider: Cecelia La, *  - Last visit with referring provider: 8/23/24     Patient Assistance for TRULICITY approved through 8/2/2025. Will have to be renewed prior to that date to prevent lapse in coverage. Medication(s) will be received at no cost to patient from [facility].     Subjective     HPI    Current Diabetes Pharmacotherapy:    - Metformin 1000 mg QAM and 500 mg HS  - Trulicity 0.75 mg weekly - Saturdays     Social History:  Current diet:   - 2 meals a day  - Does not eat that much   - Does not eat sweets  - Fruits  - B: Scrambled eggs with veggies  - D: Chicken, fish, steak   - Lost 30 lbs on Trulicity  - Does not eat past 6 pm  - Does not like potatoes    Current exercise:   - Runs around taking care of 13 grandchildren   - Usually doing a lot of movement at night; vacuum, cleaning, putting toys away    Eye exam for this year?: no - September 2024  Foot exam for this year?: no     Current monitoring regimen:   Patient is using: continuous glucose monitor  Type of CGM: Dexcom G7   - Was using Martin 3 but switched to this     Reported blood sugars:   - Has not put new sensor on yet    - Has been using fingersticks for right now  - Some highs due to being sick, thanksgiving/recent diet     Fasting:   Highest was 182    Any episodes of hypoglycemia? No    Adverse Effects: None      Objective     There were no vitals taken for this visit.    Allergies   Allergen Reactions    Penicillins Other, Hives and Palpitations     Anaphylactic reaction    Ciprofloxacin Other     Severe  shoulder pain       Historical Diabetes Pharmacotherapy:  - Trulicity (costly)  - Jardiance (yeast infections)  - Farxiga (costly but tolerated)     SECONDARY PREVENTION  - Statin? Yes   - ACE-I/ARB? Yes  - Aspirin? No    Pertinent PMH Review:  - PMH of Pancreatitis:  No  - PMH of Retinopathy: No  - PMH of Urinary Tract Infections: No  - PMH of Yeast Infections: No  - PMH of MTC: No    Lab Review  Lab Results   Component Value Date    BILITOT 0.3 08/20/2024    CALCIUM 9.7 08/20/2024    CO2 29 08/20/2024     08/20/2024    CREATININE 0.84 08/20/2024    GLUCOSE 109 (H) 08/20/2024    ALKPHOS 74 08/20/2024    K 5.1 08/20/2024    PROT 6.8 08/20/2024     08/20/2024    AST 10 08/20/2024    ALT 15 08/20/2024    BUN 15 08/20/2024    ANIONGAP 11 08/20/2024    ALBUMIN 3.9 08/20/2024    GFRF 72 01/24/2023     Lab Results   Component Value Date    TRIG 126 05/17/2024    CHOL 142 05/17/2024    HDL 42.5 05/17/2024     Lab Results   Component Value Date    HGBA1C 6.5 (H) 08/20/2024    HGBA1C 6.1 (H) 05/17/2024    HGBA1C 7.0 (H) 02/16/2024     The 10-year ASCVD risk score (Andria MC, et al., 2019) is: 13.8%    Values used to calculate the score:      Age: 64 years      Sex: Female      Is Non- : No      Diabetic: Yes      Tobacco smoker: No      Systolic Blood Pressure: 132 mmHg      Is BP treated: Yes      HDL Cholesterol: 42.5 mg/dL      Total Cholesterol: 142 mg/dL    Drug Interactions:  None    Affordability/Accessibility:  - PAP for Trulicity    Preferred Pharmacy:  [facility]    Assessment/Plan   Problem List Items Addressed This Visit       Type 2 diabetes mellitus (Chronic)    Relevant Medications    dulaglutide (Trulicity) 0.75 mg/0.5 mL pen injector    Other Relevant Orders    Referral to Clinical Pharmacy       ASSESSMENT:  Patients diabetes is controlled with most recent A1c of 6.5%.     Patient has been sick lately with illness. Working with Dr. La to get that sorted out. Blood sugars have been a bit high due to being sick. Therefore, will not make any changes until she gets better. She also plans to try the Dexcom G7 sensor. I told her if she has any issues with that one, to give me a call and we can try the G6 as that one can be placed on the  abdomen.     PLAN:  CONTINUE current DM medications  Follow up with clinical pharmacist: 2/7/25 @ 10   Follow up with PCP: 2/24/24    Thank you,  Tiesha Griffin, PharmD  Clinical Pharmacy Specialist  [phone]  [email or web address]     Continue all meds under the continuation of care with the referring provider and clinical pharmacy team.

## 2024-12-06 ENCOUNTER — TELEPHONE (OUTPATIENT)
Dept: PRIMARY CARE | Facility: CLINIC | Age: 64
End: 2024-12-06

## 2024-12-06 ENCOUNTER — APPOINTMENT (OUTPATIENT)
Dept: PHARMACY | Facility: HOSPITAL | Age: 64
End: 2024-12-06
Payer: MEDICARE

## 2024-12-06 DIAGNOSIS — R05.1 ACUTE COUGH: Primary | ICD-10-CM

## 2024-12-06 DIAGNOSIS — E11.65 TYPE 2 DIABETES MELLITUS WITH HYPERGLYCEMIA, WITHOUT LONG-TERM CURRENT USE OF INSULIN: Chronic | ICD-10-CM

## 2024-12-06 PROCEDURE — RXMED WILLOW AMBULATORY MEDICATION CHARGE

## 2024-12-06 RX ORDER — DULAGLUTIDE 0.75 MG/.5ML
0.75 INJECTION, SOLUTION SUBCUTANEOUS WEEKLY
Qty: 2 ML | Refills: 3 | Status: SHIPPED | OUTPATIENT
Start: 2024-12-06

## 2024-12-06 RX ORDER — AZITHROMYCIN 250 MG/1
TABLET, FILM COATED ORAL
Qty: 6 TABLET | Refills: 0 | Status: SHIPPED | OUTPATIENT
Start: 2024-12-06 | End: 2024-12-11

## 2024-12-06 NOTE — TELEPHONE ENCOUNTER
Pt called rx line @ 9:35am stating that [facility] told her to call in for ABX if not feeling better.   Pt not better. Requesting ABX be sent in  Pt seen on 12/3/24 by [facility]  Ok for ABX?  Please advise. Thanks. JW

## 2024-12-10 ENCOUNTER — PHARMACY VISIT (OUTPATIENT)
Dept: PHARMACY | Facility: CLINIC | Age: 64
End: 2024-12-10
Payer: MEDICARE

## 2024-12-31 PROCEDURE — RXMED WILLOW AMBULATORY MEDICATION CHARGE

## 2025-01-06 ENCOUNTER — PHARMACY VISIT (OUTPATIENT)
Dept: PHARMACY | Facility: CLINIC | Age: 65
End: 2025-01-06
Payer: MEDICARE

## 2025-01-28 PROCEDURE — RXMED WILLOW AMBULATORY MEDICATION CHARGE

## 2025-01-31 ENCOUNTER — PHARMACY VISIT (OUTPATIENT)
Dept: PHARMACY | Facility: CLINIC | Age: 65
End: 2025-01-31
Payer: MEDICARE

## 2025-02-07 ENCOUNTER — APPOINTMENT (OUTPATIENT)
Dept: PHARMACY | Facility: HOSPITAL | Age: 65
End: 2025-02-07
Payer: MEDICARE

## 2025-02-07 DIAGNOSIS — E11.65 TYPE 2 DIABETES MELLITUS WITH HYPERGLYCEMIA, WITHOUT LONG-TERM CURRENT USE OF INSULIN: Chronic | ICD-10-CM

## 2025-02-07 NOTE — PROGRESS NOTES
Pharmacist Clinic: Diabetes Management  Dinah Canales is a 64 y.o. female was referred to Clinical Pharmacy Team for diabetes management.   Referring Provider: Cecelia La, *  - Last visit with referring provider: 8/23/24     Patient Assistance for TRULICITY approved through 8/2/2025. Will have to be renewed prior to that date to prevent lapse in coverage. Medication(s) will be received at no cost to patient from [facility].     Subjective     HPI    Current Diabetes Pharmacotherapy:    - Metformin 1000 mg QAM and 500 mg HS  - Trulicity 0.75 mg weekly - Saturdays     Social History:  Current diet:   - 2 meals a day  - Does not eat that much   - Does not eat sweets  - Fruits  - B: Scrambled eggs with veggies  - D: Chicken, fish, steak   - Lost 30 lbs on Trulicity  - Does not eat past 6 pm  - Does not like potatoes    Current exercise:   - Runs around taking care of 13 grandchildren   - Usually doing a lot of movement at night; vacuum, cleaning, putting toys away    Weight:  - Feels like she has not lost any weight   - Goal is to increase weight loss    Eye exam for this year?: no - September 2024  Foot exam for this year?: no     Current monitoring regimen:   Patient is using: continuous glucose monitor  Type of CGM: Dexcom G7 - phone  - Was using Martin 3 but switched to this     Reported blood sugars:   See APG report below     Any episodes of hypoglycemia? No    Adverse Effects: None        Objective     There were no vitals taken for this visit.    Allergies   Allergen Reactions    Penicillins Other, Hives and Palpitations     Anaphylactic reaction    Ciprofloxacin Other     Severe  shoulder pain       Historical Diabetes Pharmacotherapy:  - Trulicity (costly)  - Jardiance (yeast infections)  - Farxiga (costly but tolerated)     SECONDARY PREVENTION  - Statin? Yes   - ACE-I/ARB? Yes  - Aspirin? No    Pertinent PMH Review:  - PMH of Pancreatitis: No  - PMH of Retinopathy: No  - PMH of Urinary  Tract Infections: No  - PMH of Yeast Infections: No  - PMH of MTC: No    Lab Review  Lab Results   Component Value Date    BILITOT 0.3 08/20/2024    CALCIUM 9.7 08/20/2024    CO2 29 08/20/2024     08/20/2024    CREATININE 0.84 08/20/2024    GLUCOSE 109 (H) 08/20/2024    ALKPHOS 74 08/20/2024    K 5.1 08/20/2024    PROT 6.8 08/20/2024     08/20/2024    AST 10 08/20/2024    ALT 15 08/20/2024    BUN 15 08/20/2024    ANIONGAP 11 08/20/2024    ALBUMIN 3.9 08/20/2024    GFRF 72 01/24/2023     Lab Results   Component Value Date    TRIG 126 05/17/2024    CHOL 142 05/17/2024    HDL 42.5 05/17/2024     Lab Results   Component Value Date    HGBA1C 6.5 (H) 08/20/2024    HGBA1C 6.1 (H) 05/17/2024    HGBA1C 7.0 (H) 02/16/2024     The 10-year ASCVD risk score (Andria MC, et al., 2019) is: 13.8%    Values used to calculate the score:      Age: 64 years      Sex: Female      Is Non- : No      Diabetic: Yes      Tobacco smoker: No      Systolic Blood Pressure: 132 mmHg      Is BP treated: Yes      HDL Cholesterol: 42.5 mg/dL      Total Cholesterol: 142 mg/dL    Drug Interactions:  None    Affordability/Accessibility:  - PAP for Trulicity    Preferred Pharmacy:  [facility]    Assessment/Plan   Problem List Items Addressed This Visit       Type 2 diabetes mellitus (Chronic)    Relevant Orders    Referral to Clinical Pharmacy       ASSESSMENT:  Patients diabetes is controlled with most recent A1c of 6.5%.     Patient doing very well according to CGM. She states that she feels like her numbers are spiking. In her CGM, I do not see too many spikes. She also is looking for some more weight loss and she feels like she has not noticed anything lately. She does have another box of the Trulicity 0.75 mg. I will have her continue on that dose for now, get an A1c, and we will discuss possibility of increasing to 1.5 mg next month. She is in agreement.    PLAN:  CONTINUE current DM medications  Follow up with  clinical pharmacist: 3/7/25 @ 10  Follow up with PCP: 2/24/24    Thank you,  Tiesha Griffin, PharmD  Clinical Pharmacy Specialist  [phone]  [email or web address]     Continue all meds under the continuation of care with the referring provider and clinical pharmacy team.

## 2025-02-18 LAB
ALBUMIN SERPL-MCNC: 4.1 G/DL (ref 3.6–5.1)
ALP SERPL-CCNC: 70 U/L (ref 37–153)
ALT SERPL-CCNC: 18 U/L (ref 6–29)
ANION GAP SERPL CALCULATED.4IONS-SCNC: 8 MMOL/L (CALC) (ref 7–17)
AST SERPL-CCNC: 13 U/L (ref 10–35)
BASOPHILS # BLD AUTO: 61 CELLS/UL (ref 0–200)
BASOPHILS NFR BLD AUTO: 0.7 %
BILIRUB SERPL-MCNC: 0.3 MG/DL (ref 0.2–1.2)
BUN SERPL-MCNC: 17 MG/DL (ref 7–25)
CALCIUM SERPL-MCNC: 9.5 MG/DL (ref 8.6–10.4)
CHLORIDE SERPL-SCNC: 104 MMOL/L (ref 98–110)
CHOLEST SERPL-MCNC: 163 MG/DL
CHOLEST/HDLC SERPL: 3.6 (CALC)
CO2 SERPL-SCNC: 30 MMOL/L (ref 20–32)
CREAT SERPL-MCNC: 0.75 MG/DL (ref 0.5–1.05)
EGFRCR SERPLBLD CKD-EPI 2021: 88 ML/MIN/1.73M2
EOSINOPHIL # BLD AUTO: 244 CELLS/UL (ref 15–500)
EOSINOPHIL NFR BLD AUTO: 2.8 %
ERYTHROCYTE [DISTWIDTH] IN BLOOD BY AUTOMATED COUNT: 13.1 % (ref 11–15)
EST. AVERAGE GLUCOSE BLD GHB EST-MCNC: 140 MG/DL
EST. AVERAGE GLUCOSE BLD GHB EST-SCNC: 7.7 MMOL/L
GLUCOSE SERPL-MCNC: 99 MG/DL (ref 65–99)
HBA1C MFR BLD: 6.5 % OF TOTAL HGB
HCT VFR BLD AUTO: 43.3 % (ref 35–45)
HDLC SERPL-MCNC: 45 MG/DL
HGB BLD-MCNC: 13.8 G/DL (ref 11.7–15.5)
LDLC SERPL CALC-MCNC: 95 MG/DL (CALC)
LYMPHOCYTES # BLD AUTO: 1905 CELLS/UL (ref 850–3900)
LYMPHOCYTES NFR BLD AUTO: 21.9 %
MCH RBC QN AUTO: 29.1 PG (ref 27–33)
MCHC RBC AUTO-ENTMCNC: 31.9 G/DL (ref 32–36)
MCV RBC AUTO: 91.4 FL (ref 80–100)
MONOCYTES # BLD AUTO: 522 CELLS/UL (ref 200–950)
MONOCYTES NFR BLD AUTO: 6 %
NEUTROPHILS # BLD AUTO: 5968 CELLS/UL (ref 1500–7800)
NEUTROPHILS NFR BLD AUTO: 68.6 %
NONHDLC SERPL-MCNC: 118 MG/DL (CALC)
PLATELET # BLD AUTO: 290 THOUSAND/UL (ref 140–400)
PMV BLD REES-ECKER: 10.5 FL (ref 7.5–12.5)
POTASSIUM SERPL-SCNC: 4.6 MMOL/L (ref 3.5–5.3)
PROT SERPL-MCNC: 6.8 G/DL (ref 6.1–8.1)
RBC # BLD AUTO: 4.74 MILLION/UL (ref 3.8–5.1)
SODIUM SERPL-SCNC: 142 MMOL/L (ref 135–146)
TRIGL SERPL-MCNC: 130 MG/DL
WBC # BLD AUTO: 8.7 THOUSAND/UL (ref 3.8–10.8)

## 2025-02-24 ENCOUNTER — APPOINTMENT (OUTPATIENT)
Dept: PRIMARY CARE | Facility: CLINIC | Age: 65
End: 2025-02-24
Payer: MEDICARE

## 2025-02-24 VITALS
HEART RATE: 69 BPM | DIASTOLIC BLOOD PRESSURE: 70 MMHG | WEIGHT: 250 LBS | TEMPERATURE: 97.5 F | SYSTOLIC BLOOD PRESSURE: 130 MMHG | OXYGEN SATURATION: 95 % | BODY MASS INDEX: 43.75 KG/M2

## 2025-02-24 DIAGNOSIS — E78.5 HYPERLIPIDEMIA, UNSPECIFIED HYPERLIPIDEMIA TYPE: ICD-10-CM

## 2025-02-24 DIAGNOSIS — E11.65 TYPE 2 DIABETES MELLITUS WITH HYPERGLYCEMIA, WITHOUT LONG-TERM CURRENT USE OF INSULIN: Chronic | ICD-10-CM

## 2025-02-24 DIAGNOSIS — Z12.31 ENCOUNTER FOR SCREENING MAMMOGRAM FOR MALIGNANT NEOPLASM OF BREAST: ICD-10-CM

## 2025-02-24 DIAGNOSIS — E11.3399 MODERATE NONPROLIFERATIVE DIABETIC RETINOPATHY WITHOUT MACULAR EDEMA ASSOCIATED WITH TYPE 2 DIABETES MELLITUS, UNSPECIFIED LATERALITY: ICD-10-CM

## 2025-02-24 DIAGNOSIS — F33.1 DEPRESSION, MAJOR, RECURRENT, MODERATE: ICD-10-CM

## 2025-02-24 DIAGNOSIS — Z72.3 INADEQUATE EXERCISE: ICD-10-CM

## 2025-02-24 DIAGNOSIS — E66.01 MORBID OBESITY (MULTI): ICD-10-CM

## 2025-02-24 DIAGNOSIS — E11.65 TYPE 2 DIABETES MELLITUS WITH HYPERGLYCEMIA, WITHOUT LONG-TERM CURRENT USE OF INSULIN: Primary | Chronic | ICD-10-CM

## 2025-02-24 DIAGNOSIS — I10 BENIGN ESSENTIAL HYPERTENSION: ICD-10-CM

## 2025-02-24 DIAGNOSIS — J30.9 ALLERGIC RHINITIS, UNSPECIFIED SEASONALITY, UNSPECIFIED TRIGGER: ICD-10-CM

## 2025-02-24 DIAGNOSIS — E03.9 HYPOTHYROIDISM, UNSPECIFIED TYPE: ICD-10-CM

## 2025-02-24 PROCEDURE — 1036F TOBACCO NON-USER: CPT | Performed by: FAMILY MEDICINE

## 2025-02-24 PROCEDURE — 1160F RVW MEDS BY RX/DR IN RCRD: CPT | Performed by: FAMILY MEDICINE

## 2025-02-24 PROCEDURE — RXMED WILLOW AMBULATORY MEDICATION CHARGE

## 2025-02-24 PROCEDURE — 1159F MED LIST DOCD IN RCRD: CPT | Performed by: FAMILY MEDICINE

## 2025-02-24 PROCEDURE — 3078F DIAST BP <80 MM HG: CPT | Performed by: FAMILY MEDICINE

## 2025-02-24 PROCEDURE — 4010F ACE/ARB THERAPY RXD/TAKEN: CPT | Performed by: FAMILY MEDICINE

## 2025-02-24 PROCEDURE — 99214 OFFICE O/P EST MOD 30 MIN: CPT | Performed by: FAMILY MEDICINE

## 2025-02-24 PROCEDURE — G2211 COMPLEX E/M VISIT ADD ON: HCPCS | Performed by: FAMILY MEDICINE

## 2025-02-24 PROCEDURE — 3075F SYST BP GE 130 - 139MM HG: CPT | Performed by: FAMILY MEDICINE

## 2025-02-24 RX ORDER — TIRZEPATIDE 2.5 MG/.5ML
2.5 INJECTION, SOLUTION SUBCUTANEOUS WEEKLY
Qty: 2 ML | Refills: 0 | Status: SHIPPED | OUTPATIENT
Start: 2025-02-24 | End: 2025-02-24 | Stop reason: SDUPTHER

## 2025-02-24 RX ORDER — TIRZEPATIDE 5 MG/.5ML
5 INJECTION, SOLUTION SUBCUTANEOUS WEEKLY
Qty: 2 ML | Refills: 2 | Status: SHIPPED | OUTPATIENT
Start: 2025-02-24

## 2025-02-24 RX ORDER — LEVOTHYROXINE SODIUM 112 UG/1
TABLET ORAL
Qty: 120 TABLET | Refills: 1 | Status: SHIPPED | OUTPATIENT
Start: 2025-02-24

## 2025-02-24 RX ORDER — BUPROPION HYDROCHLORIDE 150 MG/1
150 TABLET ORAL DAILY
Qty: 90 TABLET | Refills: 1 | Status: SHIPPED | OUTPATIENT
Start: 2025-02-24

## 2025-02-24 RX ORDER — DULAGLUTIDE 0.75 MG/.5ML
0.75 INJECTION, SOLUTION SUBCUTANEOUS WEEKLY
Qty: 2 ML | Refills: 3 | Status: CANCELLED | OUTPATIENT
Start: 2025-02-24

## 2025-02-24 RX ORDER — TIRZEPATIDE 5 MG/.5ML
5 INJECTION, SOLUTION SUBCUTANEOUS WEEKLY
Qty: 2 ML | Refills: 2 | Status: SHIPPED | OUTPATIENT
Start: 2025-02-24 | End: 2025-02-24 | Stop reason: SDUPTHER

## 2025-02-24 RX ORDER — METFORMIN HYDROCHLORIDE 1000 MG/1
1000 TABLET ORAL 2 TIMES DAILY
Qty: 180 TABLET | Refills: 1 | Status: SHIPPED | OUTPATIENT
Start: 2025-02-24

## 2025-02-24 RX ORDER — FLUTICASONE PROPIONATE 50 MCG
1 SPRAY, SUSPENSION (ML) NASAL DAILY
Qty: 16 G | Refills: 5 | Status: SHIPPED | OUTPATIENT
Start: 2025-02-24 | End: 2026-02-24

## 2025-02-24 RX ORDER — TIRZEPATIDE 2.5 MG/.5ML
2.5 INJECTION, SOLUTION SUBCUTANEOUS WEEKLY
Qty: 2 ML | Refills: 0 | Status: SHIPPED | OUTPATIENT
Start: 2025-02-24

## 2025-02-24 RX ORDER — ATORVASTATIN CALCIUM 20 MG/1
20 TABLET, FILM COATED ORAL DAILY
Qty: 90 TABLET | Refills: 1 | Status: SHIPPED | OUTPATIENT
Start: 2025-02-24

## 2025-02-24 RX ORDER — MONTELUKAST SODIUM 10 MG/1
10 TABLET ORAL NIGHTLY
Qty: 90 TABLET | Refills: 1 | Status: SHIPPED | OUTPATIENT
Start: 2025-02-24

## 2025-02-24 RX ORDER — LISINOPRIL 40 MG/1
40 TABLET ORAL DAILY
Qty: 90 TABLET | Refills: 1 | Status: SHIPPED | OUTPATIENT
Start: 2025-02-24

## 2025-02-24 ASSESSMENT — ENCOUNTER SYMPTOMS
ABDOMINAL PAIN: 0
COUGH: 0
VOMITING: 0
DIARRHEA: 0
NAUSEA: 0
FEVER: 0
SHORTNESS OF BREATH: 0
CHILLS: 0

## 2025-02-24 NOTE — Clinical Note
Riley Motley- Switching Dinah from Trulicity to Mounjaro to  get better weight loss benefit. Hoping medication available through assistance. She will follow-up with me for labs in 4 months. Thanks for working with her.  HLD (hyperlipidemia)

## 2025-02-24 NOTE — PROGRESS NOTES
Subjective   Patient ID: Dinah Canales is a 65 y.o. female who presents for Diabetes (Recheck, review bw ), Hypertension, and Hyperlipidemia.    Dinah has been feeling well.  She would like to get her sugars downloads lower and to lose some weight.  She has been taking the Trulicity faithfully.  Is not having adverse effects from the medication.  Her weight has not changed in the last 3 months. She has not been exercising.     Depression symptoms have been stable.  She is feeling increased stress but overall feels medication is working well.         Review of Systems   Constitutional:  Negative for chills and fever.   Respiratory:  Negative for cough and shortness of breath.    Cardiovascular:  Negative for chest pain.   Gastrointestinal:  Negative for abdominal pain, diarrhea, nausea and vomiting.       Objective   /70   Pulse 69   Temp 36.4 °C (97.5 °F)   Wt 113 kg (250 lb)   SpO2 95%   BMI 43.75 kg/m²     Physical Exam  Constitutional:       General: She is not in acute distress.     Appearance: Normal appearance.   HENT:      Head: Normocephalic.      Mouth/Throat:      Mouth: Mucous membranes are moist.   Eyes:      Extraocular Movements: Extraocular movements intact.      Conjunctiva/sclera: Conjunctivae normal.   Cardiovascular:      Rate and Rhythm: Normal rate and regular rhythm.      Heart sounds: No murmur heard.  Pulmonary:      Breath sounds: No wheezing or rhonchi.   Musculoskeletal:      Cervical back: Neck supple.   Skin:     General: Skin is warm and dry.   Neurological:      Mental Status: She is alert.   Psychiatric:         Mood and Affect: Mood normal.         Behavior: Behavior normal.         Assessment/Plan   Problem List Items Addressed This Visit             ICD-10-CM    Allergic rhinitis J30.9    Relevant Medications    fluticasone (Flonase) 50 mcg/actuation nasal spray    montelukast (Singulair) 10 mg tablet    Benign essential hypertension (Chronic) I10     Controlled.           Relevant Medications    lisinopril 40 mg tablet    Hyperlipidemia (Chronic) E78.5     Continue atorvastatin.          Relevant Medications    atorvastatin (Lipitor) 20 mg tablet    Other Relevant Orders    Lipid Panel    Hypothyroidism (Chronic) E03.9     Check TSH with next labs.         Relevant Medications    levothyroxine (Synthroid, Levoxyl) 112 mcg tablet    Other Relevant Orders    Thyroid Stimulating Hormone    Morbid obesity (Multi) E66.01    Type 2 diabetes mellitus - Primary (Chronic) E11.9     Switch from Trulicity to Mounjaro for increased weight loss benefit.  Patient to start with 2.5 mg for 4 weeks then increase to 5 mg.  Patient to continue follow-up with clinical pharmacist for up-titration of medication.  Recheck labs in 4 months         Relevant Medications    metFORMIN (Glucophage) 1,000 mg tablet    tirzepatide (Mounjaro) 2.5 mg/0.5 mL pen injector    tirzepatide (Mounjaro) 5 mg/0.5 mL pen injector    Other Relevant Orders    Comprehensive Metabolic Panel    Hemoglobin A1C    Depression, major, recurrent, moderate F33.1     Stable.          Relevant Medications    buPROPion XL (Wellbutrin XL) 150 mg 24 hr tablet    Moderate nonproliferative diabetic retinopathy without macular edema associated with type 2 diabetes mellitus E11.3399     Following with ophthalmology.          Other Visit Diagnoses         Codes    Inadequate exercise     Z72.3    Encounter for screening mammogram for malignant neoplasm of breast     Z12.31    Relevant Orders    BI mammo bilateral screening tomosynthesis

## 2025-02-24 NOTE — ASSESSMENT & PLAN NOTE
Switch from Trulicity to Mounjaro for increased weight loss benefit.  Patient to start with 2.5 mg for 4 weeks then increase to 5 mg.  Patient to continue follow-up with clinical pharmacist for up-titration of medication.  Recheck labs in 4 months

## 2025-02-25 ENCOUNTER — PHARMACY VISIT (OUTPATIENT)
Dept: PHARMACY | Facility: CLINIC | Age: 65
End: 2025-02-25
Payer: MEDICARE

## 2025-03-06 NOTE — PROGRESS NOTES
Pharmacist Clinic: Diabetes Management  Dinah Canales is a 65 y.o. female was referred to Clinical Pharmacy Team for diabetes management.   Referring Provider: Cecelia La, *  - Last visit with referring provider: 2/24/25     Patient Assistance for Mounjaro approved through 8/2/2025. Will have to be renewed prior to that date to prevent lapse in coverage. Medication(s) will be received at no cost to patient from [facility].     Subjective     HPI    Current Diabetes Pharmacotherapy:    - Metformin 1000 mg QAM and 500 mg HS  - Trulicity 0.75 mg weekly - Saturdays    - Will start Mounjaro 2.5 in 2 weeks    Social History:  Current diet:   - 2 meals a day  - Does not eat that much   - Does not eat sweets  - Fruits  - B: Scrambled eggs with veggies  - D: Chicken, fish, steak   - Lost 30 lbs on Trulicity  - Does not eat past 6 pm  - Does not like potatoes    Current exercise:   - Runs around taking care of 13 grandchildren   - Usually doing a lot of movement at night; vacuum, cleaning, putting toys away    Weight:  - Baseline weight: 250 lbs    Eye exam for this year?: no - September 2024  Foot exam for this year?: no     Current monitoring regimen:   Patient is using: continuous glucose monitor  Type of CGM: Dexcom G7 - phone    Reported blood sugars:   See APG report below     Any episodes of hypoglycemia? No    Adverse Effects: None        Objective     There were no vitals taken for this visit.    Allergies   Allergen Reactions    Penicillins Other, Hives and Palpitations     Anaphylactic reaction    Ciprofloxacin Other     Severe  shoulder pain       Historical Diabetes Pharmacotherapy:  - Trulicity (costly)  - Jardiance (yeast infections)  - Farxiga (costly but tolerated)     SECONDARY PREVENTION  - Statin? Yes   - ACE-I/ARB? Yes  - Aspirin? No    Pertinent PMH Review:  - PMH of Pancreatitis: No  - PMH of Retinopathy: No  - PMH of Urinary Tract Infections: No  - PMH of Yeast Infections: No  -  PMH of MTC: No    Lab Review  Lab Results   Component Value Date    BILITOT 0.3 02/17/2025    CALCIUM 9.5 02/17/2025    CO2 30 02/17/2025     02/17/2025    CREATININE 0.75 02/17/2025    GLUCOSE 99 02/17/2025    ALKPHOS 70 02/17/2025    K 4.6 02/17/2025    PROT 6.8 02/17/2025     02/17/2025    AST 13 02/17/2025    ALT 18 02/17/2025    BUN 17 02/17/2025    ANIONGAP 8 02/17/2025    ALBUMIN 4.1 02/17/2025    GFRF 72 01/24/2023     Lab Results   Component Value Date    TRIG 130 02/17/2025    CHOL 163 02/17/2025    HDL 45 (L) 02/17/2025     Lab Results   Component Value Date    HGBA1C 6.5 (H) 02/17/2025    HGBA1C 6.5 (H) 08/20/2024    HGBA1C 6.1 (H) 05/17/2024     The 10-year ASCVD risk score (Andria MC, et al., 2019) is: 15.5%    Values used to calculate the score:      Age: 65 years      Sex: Female      Is Non- : No      Diabetic: Yes      Tobacco smoker: No      Systolic Blood Pressure: 130 mmHg      Is BP treated: Yes      HDL Cholesterol: 45 mg/dL      Total Cholesterol: 163 mg/dL    Drug Interactions:  - None    Affordability/Accessibility:  - PAP for Trulicity    Preferred Pharmacy:  - [facility]    Assessment/Plan   Problem List Items Addressed This Visit       Morbid obesity (Multi) - Primary    Relevant Orders    Referral to Clinical Pharmacy    Type 2 diabetes mellitus (Chronic)    Relevant Orders    Referral to Clinical Pharmacy     ASSESSMENT:  Patients diabetes is controlled with most recent A1c of 6.5%.     A1c came back at goal still. Was switched from Trulicity to Mounjaro to help with weight loss. States she would like to lose weight to help with knee pain. She has 2 pens left of Trulicity. She is to start Mounajro 2.5 mg once she finishes. Will follow up when she is toward end of box to evaluate weight loss.     PLAN:  FINISH box of Trulicity then  START Mounjaro 2.5 mg once weekly  CONTINUE metformin  Follow up with clinical pharmacist: 4/11/25 @ 10   Follow up with  PCP: 6/24/25    Thank you,  Tiesha Griffin, PharmD  Clinical Pharmacy Specialist  [phone]  [email or web address]     Continue all meds under the continuation of care with the referring provider and clinical pharmacy team.

## 2025-03-07 ENCOUNTER — APPOINTMENT (OUTPATIENT)
Dept: PHARMACY | Facility: HOSPITAL | Age: 65
End: 2025-03-07
Payer: MEDICARE

## 2025-03-07 DIAGNOSIS — E11.65 TYPE 2 DIABETES MELLITUS WITH HYPERGLYCEMIA, WITHOUT LONG-TERM CURRENT USE OF INSULIN: Chronic | ICD-10-CM

## 2025-03-07 DIAGNOSIS — E66.01 MORBID OBESITY (MULTI): Primary | ICD-10-CM

## 2025-03-26 ENCOUNTER — APPOINTMENT (OUTPATIENT)
Dept: OPHTHALMOLOGY | Facility: CLINIC | Age: 65
End: 2025-03-26
Payer: MEDICARE

## 2025-03-26 ENCOUNTER — HOSPITAL ENCOUNTER (OUTPATIENT)
Dept: RADIOLOGY | Facility: CLINIC | Age: 65
Discharge: HOME | End: 2025-03-26
Payer: MEDICARE

## 2025-03-26 VITALS — HEIGHT: 63 IN | WEIGHT: 250 LBS | BODY MASS INDEX: 44.3 KG/M2

## 2025-03-26 DIAGNOSIS — Z12.31 ENCOUNTER FOR SCREENING MAMMOGRAM FOR MALIGNANT NEOPLASM OF BREAST: ICD-10-CM

## 2025-03-26 PROCEDURE — 77063 BREAST TOMOSYNTHESIS BI: CPT | Performed by: RADIOLOGY

## 2025-03-26 PROCEDURE — 77067 SCR MAMMO BI INCL CAD: CPT

## 2025-03-26 PROCEDURE — 77067 SCR MAMMO BI INCL CAD: CPT | Performed by: RADIOLOGY

## 2025-04-10 NOTE — PROGRESS NOTES
Pharmacist Clinic: Diabetes Management  Dinah Canales is a 65 y.o. female was referred to Clinical Pharmacy Team for diabetes management.   Referring Provider: Cecelia La, *  - Last visit with referring provider: 2/24/25     Patient Assistance for Mounjaro approved through 8/2/2025. Will have to be renewed prior to that date to prevent lapse in coverage. Medication(s) will be received at no cost to patient from [facility].     Subjective     HPI    Current Diabetes Pharmacotherapy:    - Metformin 1000 mg QAM and 500 mg HS  - Mounjaro 2.5 mg weekly - Saturday (1 dose left)    Social History:  Current diet:   - 2 meals a day  - Does not eat that much   - Does not eat sweets  - Fruits  - B: Scrambled eggs with veggies  - D: Chicken, fish, steak   - Lost 30 lbs on Trulicity  - Does not eat past 6 pm  - Does not like potatoes  - Felt quiñones faster at first and now hungrier     Current exercise:   - Runs around taking care of 13 grandchildren   - Usually doing a lot of movement at night; vacuum, cleaning, putting toys away    Weight:  - Baseline weight: 250 lbs  - Current weight: 247 lbs    Eye exam for this year?: no - September 2024  Foot exam for this year?: no     Current monitoring regimen:   Patient is using: continuous glucose monitor  Type of CGM: Dexcom G7 - phone    Reported blood sugars:   See APG report below     Any episodes of hypoglycemia? Yes, overnight    Adverse Effects: Some constipation at first but has gone; Dulcolax helps         Objective     There were no vitals taken for this visit.    Allergies   Allergen Reactions    Penicillins Other, Hives and Palpitations     Anaphylactic reaction    Ciprofloxacin Other     Severe  shoulder pain       Historical Diabetes Pharmacotherapy:  - Trulicity (costly)  - Jardiance (yeast infections)  - Farxiga (costly but tolerated)     SECONDARY PREVENTION  - Statin? Yes   - ACE-I/ARB? Yes  - Aspirin? No    Pertinent PMH Review:  - PMH of  Pancreatitis: No  - PMH of Retinopathy: No  - PMH of Urinary Tract Infections: No  - PMH of Yeast Infections: No  - PMH of MTC: No    Lab Review  Lab Results   Component Value Date    BILITOT 0.3 02/17/2025    CALCIUM 9.5 02/17/2025    CO2 30 02/17/2025     02/17/2025    CREATININE 0.75 02/17/2025    GLUCOSE 99 02/17/2025    ALKPHOS 70 02/17/2025    K 4.6 02/17/2025    PROT 6.8 02/17/2025     02/17/2025    AST 13 02/17/2025    ALT 18 02/17/2025    BUN 17 02/17/2025    ANIONGAP 8 02/17/2025    ALBUMIN 4.1 02/17/2025    GFRF 72 01/24/2023     Lab Results   Component Value Date    TRIG 130 02/17/2025    CHOL 163 02/17/2025    HDL 45 (L) 02/17/2025     Lab Results   Component Value Date    HGBA1C 6.5 (H) 02/17/2025    HGBA1C 6.5 (H) 08/20/2024    HGBA1C 6.1 (H) 05/17/2024     The 10-year ASCVD risk score (Andria MC, et al., 2019) is: 15.5%    Values used to calculate the score:      Age: 65 years      Sex: Female      Is Non- : No      Diabetic: Yes      Tobacco smoker: No      Systolic Blood Pressure: 130 mmHg      Is BP treated: Yes      HDL Cholesterol: 45 mg/dL      Total Cholesterol: 163 mg/dL    Drug Interactions:  - None    Affordability/Accessibility:  - PAP for [facility]    Preferred Pharmacy:  - [facility]    Assessment/Plan   Problem List Items Addressed This Visit       Morbid obesity (Multi)    Relevant Orders    Referral to Clinical Pharmacy    Type 2 diabetes mellitus (Chronic)    Relevant Orders    Referral to Clinical Pharmacy       ASSESSMENT:  Patients diabetes is controlled with most recent A1c of 6.5%.     Patient tolerating Mounjaro well. Had constipation at first but has gotten better. We discussed various OTC fiber supplements to help. As for weight loss, she has lost 4 lbs but starting to feel hunger creeping back. Will increase Mounjaro to 5 mg. I will also have her stop evening dose of Metformin as she is going low around 3 am most nights.    PLAN:  STOP PM  dose of Metformin  INCREASE Mounjaro to 5 mg weekly   Follow up with clinical pharmacist: 5/9/25 @ 9:40  Follow up with PCP: 6/24/25    Thank you,  Tiesha Griffin, PharmD  Clinical Pharmacy Specialist  [phone]  [email or web address]     Continue all meds under the continuation of care with the referring provider and clinical pharmacy team.

## 2025-04-11 ENCOUNTER — APPOINTMENT (OUTPATIENT)
Dept: PHARMACY | Facility: HOSPITAL | Age: 65
End: 2025-04-11
Payer: MEDICARE

## 2025-04-11 ENCOUNTER — TELEPHONE (OUTPATIENT)
Dept: PRIMARY CARE | Facility: CLINIC | Age: 65
End: 2025-04-11

## 2025-04-11 ENCOUNTER — PHARMACY VISIT (OUTPATIENT)
Dept: PHARMACY | Facility: CLINIC | Age: 65
End: 2025-04-11
Payer: MEDICARE

## 2025-04-11 DIAGNOSIS — E11.65 TYPE 2 DIABETES MELLITUS WITH HYPERGLYCEMIA, WITHOUT LONG-TERM CURRENT USE OF INSULIN: Chronic | ICD-10-CM

## 2025-04-11 DIAGNOSIS — E66.01 MORBID OBESITY (MULTI): ICD-10-CM

## 2025-04-11 PROCEDURE — RXMED WILLOW AMBULATORY MEDICATION CHARGE

## 2025-04-13 ASSESSMENT — SLIT LAMP EXAM - LIDS
COMMENTS: GOOD POSITION, DERMATOCHALASIS
COMMENTS: GOOD POSITION, DERMATOCHALASIS

## 2025-04-13 ASSESSMENT — CUP TO DISC RATIO
OD_RATIO: 0.25
OS_RATIO: 0.25

## 2025-04-13 ASSESSMENT — EXTERNAL EXAM - RIGHT EYE: OD_EXAM: NORMAL

## 2025-04-13 ASSESSMENT — EXTERNAL EXAM - LEFT EYE: OS_EXAM: NORMAL

## 2025-04-13 NOTE — PROGRESS NOTES
Glaucoma suspect, bilateral  -(+)FH glaucoma - son  -History of using latanoprost OU QHS for elevated IOP. Stopped using after CEIOL/MIGS (stent), done OU in 2021.   -Pachymetry (9/25/24) - 566/575  -HVF 24-2 (4/16/25) - OD: 13/16FL 7%FP 8%FN. TD>PD, but WNL. OS: 13/16FL 6%FP 6%FN. Scattered. First field.   -OCT RNFL (9/25/24) - SS: 7/10 OD and 9/10 OS. OD: WNL. OS: Bord I. 84/88.   -Will plan to follow with OCT RNFL q6 months. Plan to repeat HVF 24-2 in the future as needed.   -F/u 6-8 months - comprehensive exam and OCT RNFL.     Epiretinal membrane, bilateral  -OCT macula (9/25/24) - SS: 9/10 OU. ERM OU with mild surface distortion. Mild intraretinal edema OS? 298/264.   -Saw Dr. Hooks 11/15/24 - recommended observation.    Diabetes  Retinal hemorrhage, left eye  -HbA1c= 6.5 (2/17/25). Few hemorrhages, OS, no macular edema. Continue close monitoring of blood glucose, blood pressure, and cholesterol. Plan for annual dilated eye exam.    Allergic conjunctivitis, bilateral  -Tearing and feeling like sand in eyes in the morning, itching, history of allergies almost all year round  -Continue warm compresses and artificial tears PRN.   -May start Azelastine OU BID PRN itching/allergies    Pseudophakia  -Stable. Good vision.     Hyperopia  Astigmatism  Presbyopia  -Rx given 9/25/24.       No FH of AMD

## 2025-04-16 ENCOUNTER — APPOINTMENT (OUTPATIENT)
Dept: OPHTHALMOLOGY | Age: 65
End: 2025-04-16
Payer: MEDICARE

## 2025-04-16 DIAGNOSIS — H52.03 HYPEROPIA, BILATERAL: ICD-10-CM

## 2025-04-16 DIAGNOSIS — H52.4 PRESBYOPIA: ICD-10-CM

## 2025-04-16 DIAGNOSIS — H52.203 ASTIGMATISM OF BOTH EYES, UNSPECIFIED TYPE: ICD-10-CM

## 2025-04-16 DIAGNOSIS — E11.3393 MODERATE NONPROLIFERATIVE DIABETIC RETINOPATHY OF BOTH EYES WITHOUT MACULAR EDEMA ASSOCIATED WITH TYPE 2 DIABETES MELLITUS: ICD-10-CM

## 2025-04-16 DIAGNOSIS — H10.13 ALLERGIC CONJUNCTIVITIS OF BOTH EYES: ICD-10-CM

## 2025-04-16 DIAGNOSIS — H35.62 RETINAL HEMORRHAGE, LEFT: ICD-10-CM

## 2025-04-16 DIAGNOSIS — Z96.1 PSEUDOPHAKIA: ICD-10-CM

## 2025-04-16 DIAGNOSIS — E11.65 TYPE 2 DIABETES MELLITUS WITH HYPERGLYCEMIA, WITHOUT LONG-TERM CURRENT USE OF INSULIN: ICD-10-CM

## 2025-04-16 DIAGNOSIS — H40.003 GLAUCOMA SUSPECT OF BOTH EYES: Primary | ICD-10-CM

## 2025-04-16 DIAGNOSIS — H35.373 EPIRETINAL MEMBRANE (ERM) OF BOTH EYES: ICD-10-CM

## 2025-04-16 PROCEDURE — 92083 EXTENDED VISUAL FIELD XM: CPT | Performed by: OPHTHALMOLOGY

## 2025-04-16 PROCEDURE — 99213 OFFICE O/P EST LOW 20 MIN: CPT | Performed by: OPHTHALMOLOGY

## 2025-04-16 PROCEDURE — G2211 COMPLEX E/M VISIT ADD ON: HCPCS | Performed by: OPHTHALMOLOGY

## 2025-04-16 RX ORDER — AZELASTINE HYDROCHLORIDE 0.5 MG/ML
SOLUTION/ DROPS OPHTHALMIC
Qty: 6 ML | Refills: 6 | Status: SHIPPED | OUTPATIENT
Start: 2025-04-16

## 2025-04-16 ASSESSMENT — ENCOUNTER SYMPTOMS
HEMATOLOGIC/LYMPHATIC NEGATIVE: 0
ALLERGIC/IMMUNOLOGIC NEGATIVE: 0
CARDIOVASCULAR NEGATIVE: 0
CONSTITUTIONAL NEGATIVE: 0
MUSCULOSKELETAL NEGATIVE: 0
PSYCHIATRIC NEGATIVE: 0
GASTROINTESTINAL NEGATIVE: 0
NEUROLOGICAL NEGATIVE: 0
RESPIRATORY NEGATIVE: 0
ENDOCRINE NEGATIVE: 0
EYES NEGATIVE: 1

## 2025-04-16 ASSESSMENT — VISUAL ACUITY
METHOD: SNELLEN - LINEAR
OS_SC+: -2
OS_SC: 20/30
OD_SC: 20/25

## 2025-04-16 ASSESSMENT — TONOMETRY
OS_IOP_MMHG: 15
IOP_METHOD: GOLDMANN APPLANATION
OD_IOP_MMHG: 15

## 2025-04-16 ASSESSMENT — PACHYMETRY
OD_CT(UM): 566
OS_CT(UM): 575
EXAM_DATE: 9/25/2024

## 2025-05-09 ENCOUNTER — APPOINTMENT (OUTPATIENT)
Dept: PHARMACY | Facility: HOSPITAL | Age: 65
End: 2025-05-09
Payer: MEDICARE

## 2025-05-09 DIAGNOSIS — E11.65 TYPE 2 DIABETES MELLITUS WITH HYPERGLYCEMIA, WITHOUT LONG-TERM CURRENT USE OF INSULIN: Chronic | ICD-10-CM

## 2025-05-09 DIAGNOSIS — K59.00 CONSTIPATION, UNSPECIFIED CONSTIPATION TYPE: Primary | ICD-10-CM

## 2025-05-09 DIAGNOSIS — E66.01 MORBID OBESITY (MULTI): ICD-10-CM

## 2025-05-09 PROCEDURE — RXMED WILLOW AMBULATORY MEDICATION CHARGE

## 2025-05-09 RX ORDER — DOCUSATE SODIUM 100 MG/1
100 CAPSULE, LIQUID FILLED ORAL DAILY
Qty: 30 CAPSULE | Refills: 3 | Status: SHIPPED | OUTPATIENT
Start: 2025-05-09

## 2025-05-09 NOTE — PROGRESS NOTES
Pharmacist Clinic: Diabetes Management  Dinah Canales is a 65 y.o. female was referred to Clinical Pharmacy Team for diabetes management.   Referring Provider: Cecelia La, *  - Last visit with referring provider: 2/24/25     Patient Assistance for Mounjaro approved through 8/2/2025. Will have to be renewed prior to that date to prevent lapse in coverage. Medication(s) will be received at no cost to patient from [facility].     Subjective     HPI    Current Diabetes Pharmacotherapy:    - Metformin 1000 mg QAM   - Mounjaro 5 mg weekly - Saturday    Social History:  Current diet:   - 2 meals a day  - Does not eat that much   - Does not eat sweets  - Fruits  - B: Scrambled eggs with veggies  - D: Chicken, fish, steak   - Lost 30 lbs on Trulicity  - Does not eat past 6 pm  - Does not like potatoes  - Felt quiñones faster at first and now hungrier     Current exercise:   - Runs around taking care of 13 grandchildren   - Usually doing a lot of movement at night; vacuum, cleaning, putting toys away    Weight:  - Baseline weight: 250 lbs  - Weight last appt: 247 lbs  - Current weight: 237.3 lbs    Eye exam for this year?: no - September 2024  Foot exam for this year?: no     Current monitoring regimen:   Patient is using: continuous glucose monitor  Type of CGM: Dexcom G7 - phone    Reported blood sugars:   See APG report below     Any episodes of hypoglycemia? No    Adverse Effects: Constipation        Objective     There were no vitals taken for this visit.    Allergies   Allergen Reactions    Penicillins Other, Hives and Palpitations     Anaphylactic reaction    Ciprofloxacin Other     Severe  shoulder pain       Historical Diabetes Pharmacotherapy:  - Trulicity (costly)  - Jardiance (yeast infections)  - Farxiga (costly but tolerated)     SECONDARY PREVENTION  - Statin? Yes   - ACE-I/ARB? Yes  - Aspirin? No    Pertinent PMH Review:  - PMH of Pancreatitis: No  - PMH of Retinopathy: No  - PMH of Urinary  Tract Infections: No  - PMH of Yeast Infections: No  - PMH of MTC: No    Lab Review  Lab Results   Component Value Date    BILITOT 0.3 02/17/2025    CALCIUM 9.5 02/17/2025    CO2 30 02/17/2025     02/17/2025    CREATININE 0.75 02/17/2025    GLUCOSE 99 02/17/2025    ALKPHOS 70 02/17/2025    K 4.6 02/17/2025    PROT 6.8 02/17/2025     02/17/2025    AST 13 02/17/2025    ALT 18 02/17/2025    BUN 17 02/17/2025    ANIONGAP 8 02/17/2025    ALBUMIN 4.1 02/17/2025    GFRF 72 01/24/2023     Lab Results   Component Value Date    TRIG 130 02/17/2025    CHOL 163 02/17/2025    HDL 45 (L) 02/17/2025     Lab Results   Component Value Date    HGBA1C 6.5 (H) 02/17/2025    HGBA1C 6.5 (H) 08/20/2024    HGBA1C 6.1 (H) 05/17/2024     The 10-year ASCVD risk score (Andria MC, et al., 2019) is: 15.5%    Values used to calculate the score:      Age: 65 years      Sex: Female      Is Non- : No      Diabetic: Yes      Tobacco smoker: No      Systolic Blood Pressure: 130 mmHg      Is BP treated: Yes      HDL Cholesterol: 45 mg/dL      Total Cholesterol: 163 mg/dL    Drug Interactions:  - None    Affordability/Accessibility:  - PAP for [facility]    Preferred Pharmacy:  - [facility]    Assessment/Plan   Problem List Items Addressed This Visit       Morbid obesity (Multi)    Relevant Orders    Referral to Clinical Pharmacy    Type 2 diabetes mellitus (Chronic)    Relevant Orders    Referral to Clinical Pharmacy     Other Visit Diagnoses         Constipation, unspecified constipation type    -  Primary    Relevant Medications    docusate sodium (Colace) 100 mg capsule            ASSESSMENT:  Patients diabetes is controlled with most recent A1c of 6.5%.     Blood sugars are looking fantastic. She has even lost 10 lbs since we last spoke. Having some constipation still so I will send her in a stool softener to see if this helps. Otherwise, we will continue current medications.    PLAN:  CONTINUE all DM  medications  START Docusate 100 mg daily  Follow up with clinical pharmacist: 6/6/25 @ 9:40   Follow up with PCP: 6/24/25    Thank you,  Tiesha Griffin, PharmD  Clinical Pharmacy Specialist  [phone]  [email or web address]     Continue all meds under the continuation of care with the referring provider and clinical pharmacy team.

## 2025-05-12 ENCOUNTER — PHARMACY VISIT (OUTPATIENT)
Dept: PHARMACY | Facility: CLINIC | Age: 65
End: 2025-05-12
Payer: COMMERCIAL

## 2025-05-16 ENCOUNTER — APPOINTMENT (OUTPATIENT)
Dept: OPHTHALMOLOGY | Facility: CLINIC | Age: 65
End: 2025-05-16
Payer: MEDICARE

## 2025-05-16 DIAGNOSIS — E11.3393 MODERATE NONPROLIFERATIVE DIABETIC RETINOPATHY OF BOTH EYES WITHOUT MACULAR EDEMA ASSOCIATED WITH TYPE 2 DIABETES MELLITUS: Primary | ICD-10-CM

## 2025-05-16 PROCEDURE — 92134 CPTRZ OPH DX IMG PST SGM RTA: CPT

## 2025-05-16 PROCEDURE — 99213 OFFICE O/P EST LOW 20 MIN: CPT

## 2025-05-16 ASSESSMENT — ENCOUNTER SYMPTOMS
MUSCULOSKELETAL NEGATIVE: 0
PSYCHIATRIC NEGATIVE: 0
ALLERGIC/IMMUNOLOGIC NEGATIVE: 0
CARDIOVASCULAR NEGATIVE: 0
ENDOCRINE NEGATIVE: 0
EYES NEGATIVE: 1
NEUROLOGICAL NEGATIVE: 0
GASTROINTESTINAL NEGATIVE: 0
RESPIRATORY NEGATIVE: 0
CONSTITUTIONAL NEGATIVE: 0
HEMATOLOGIC/LYMPHATIC NEGATIVE: 0

## 2025-05-16 ASSESSMENT — VISUAL ACUITY
OD_SC: 20/25
OS_SC: 20/25
METHOD: SNELLEN - LINEAR
OS_SC+: -2

## 2025-05-16 ASSESSMENT — TONOMETRY
OD_IOP_MMHG: 15
OS_IOP_MMHG: 15
IOP_METHOD: GOLDMANN APPLANATION

## 2025-05-16 ASSESSMENT — CUP TO DISC RATIO
OS_RATIO: 0.25
OD_RATIO: 0.25

## 2025-05-16 ASSESSMENT — EXTERNAL EXAM - RIGHT EYE: OD_EXAM: NORMAL

## 2025-05-16 ASSESSMENT — PACHYMETRY
OD_CT(UM): 566
OS_CT(UM): 575
EXAM_DATE: 9/25/2024

## 2025-05-16 ASSESSMENT — EXTERNAL EXAM - LEFT EYE: OS_EXAM: NORMAL

## 2025-05-16 ASSESSMENT — SLIT LAMP EXAM - LIDS
COMMENTS: NORMAL
COMMENTS: NORMAL

## 2025-05-16 NOTE — PROGRESS NOTES
Epiretinal membrane, bilateral  Lamellar Macular Hole - Right Eye  -new patient referred by Dr. Marcum  OCT : 05/16/25     OD: ERM abnormal Foveal Contour, lamellar hole, EZ line preserved, (-) IRF/subretinal fluid (SRF), CST-WNL  OS:  ERM, abnormal Foveal Contour, EZ line preserved, (-) IRF/subretinal fluid (SRF), CST-WNL     Patient is reporting metamorphopsia and monocular diplopia ( OS > OD)   Overall Erms appear mild, will monitor for now but consider PPV-MP if symptoms worsen     Impression  Trace DME OS, ERM OD  Stable   RTC 12 months    Moderate Nonproliferative Diabetic Retinopathy without DME - OU  T2DM x 10 years,   -HbA1c= 6.5 (8/20/24). Few hemorrhages, OS, no macular edema. Continue close monitoring of blood glucose, blood pressure, and cholesterol. Plan for annual dilated eye exam.    Glaucoma suspect, bilateral  -(+)FH glaucoma - son  -History of using latanoprost OU QHS for elevated IOP. Stopped using after CEIOL/MIGS (stent), done OU in 2021.   - Followed by Dr. Marcum     Pseudophakia  -Stable. Good vision.        MEHDI re: 1/14@2:00 cali reminder

## 2025-06-05 NOTE — PROGRESS NOTES
Pharmacist Clinic: Diabetes Management  Dinah Canales is a 65 y.o. female was referred to Clinical Pharmacy Team for diabetes management.   Referring Provider: Cecelia La, *  - Last visit with referring provider: 2/24/25     Patient Assistance for Mounjaro approved through 8/2/2025. Will have to be renewed prior to that date to prevent lapse in coverage. Medication(s) will be received at no cost to patient from [facility].     Subjective     HPI    Current Diabetes Pharmacotherapy:    - Metformin 1000 mg QAM   - Mounjaro 5 mg weekly - Saturday    Social History:  Current diet:   - 2 meals a day  - Does not eat that much   - Does not eat sweets  - Fruits  - B: Scrambled eggs with veggies  - D: Chicken, fish, steak   - Lost 30 lbs on Trulicity  - Does not eat past 6 pm  - Does not like potatoes  - Feeling hungry more this month     Current exercise:   - Runs around taking care of 13 grandchildren   - Usually doing a lot of movement at night; vacuum, cleaning, putting toys away    Weight:  - Baseline weight: 250 lbs  - Weight last appt: 237.3 lbs  - Current weight: 238 lbs    Eye exam for this year?: no - September 2024  Foot exam for this year?: no     Current monitoring regimen:   Patient is using: continuous glucose monitor  Type of CGM: Dexcom G7 - phone    Reported blood sugars:   See APG report below   - Stress lately with daughter     Any episodes of hypoglycemia? No    Adverse Effects: Constipation has gotten better   - Miralax/juice helps        Objective     There were no vitals taken for this visit.    Allergies   Allergen Reactions    Penicillins Other, Hives and Palpitations     Anaphylactic reaction    Ciprofloxacin Other     Severe  shoulder pain       Historical Diabetes Pharmacotherapy:  - Trulicity (costly)  - Jardiance (yeast infections)  - Farxiga (costly but tolerated)     SECONDARY PREVENTION  - Statin? Yes   - ACE-I/ARB? Yes  - Aspirin? No    Pertinent PMH Review:  - PMH of  "Pancreatitis: No  - PMH of Retinopathy: No  - PMH of Urinary Tract Infections: No  - PMH of Yeast Infections: No  - PMH of MTC: No    Lab Review  Lab Results   Component Value Date    BILITOT 0.3 02/17/2025    CALCIUM 9.5 02/17/2025    CO2 30 02/17/2025     02/17/2025    CREATININE 0.75 02/17/2025    GLUCOSE 99 02/17/2025    ALKPHOS 70 02/17/2025    K 4.6 02/17/2025    PROT 6.8 02/17/2025     02/17/2025    AST 13 02/17/2025    ALT 18 02/17/2025    BUN 17 02/17/2025    ANIONGAP 8 02/17/2025    ALBUMIN 4.1 02/17/2025    GFRF 72 01/24/2023     Lab Results   Component Value Date    EGFR 88 02/17/2025      No results found for: \"MICROALBCREA\"    Lab Results   Component Value Date    TRIG 130 02/17/2025    CHOL 163 02/17/2025    HDL 45 (L) 02/17/2025     Lab Results   Component Value Date    HGBA1C 6.5 (H) 02/17/2025    HGBA1C 6.5 (H) 08/20/2024    HGBA1C 6.1 (H) 05/17/2024     The 10-year ASCVD risk score (Andria MC, et al., 2019) is: 15.5%    Values used to calculate the score:      Age: 65 years      Sex: Female      Is Non- : No      Diabetic: Yes      Tobacco smoker: No      Systolic Blood Pressure: 130 mmHg      Is BP treated: Yes      HDL Cholesterol: 45 mg/dL      Total Cholesterol: 163 mg/dL    Drug Interactions:  - None    Affordability/Accessibility:  - PAP for Trulicity    Preferred Pharmacy:  - [facility]    Assessment/Plan   Problem List Items Addressed This Visit       Morbid obesity (Multi)    Relevant Orders    Referral to Clinical Pharmacy    Type 2 diabetes mellitus (Chronic)    Relevant Medications    tirzepatide (Mounjaro) 7.5 mg/0.5 mL pen injector    Other Relevant Orders    Referral to Clinical Pharmacy    Albumin-Creatinine Ratio, Urine Random     ASSESSMENT:  Patients diabetes is controlled with most recent A1c of 6.5%.     Patient has gained a lb. Blood sugars are still looking excellent. She has been using Miralax which has helped compared to the docusate so " will cancel that. Will go ahead and increase Mounjaro to 7.5 mg to help with weight loss. Can consider reducing/discontinuing Metformin next visit if able. She plans to get labs done before PCP appt. I will add a UACR since she has not gotten that completed.     PLAN:  INCREASE Mounjaro to 7.5 mg weekly  Follow up with clinical pharmacist: 7/8/25 @ 9:40   Follow up with PCP: 6/24/25    Thank you,  Tiesha Griffin, PharmD  Clinical Pharmacy Specialist  [phone]  [email or web address]     Continue all meds under the continuation of care with the referring provider and clinical pharmacy team.

## 2025-06-06 ENCOUNTER — APPOINTMENT (OUTPATIENT)
Dept: PHARMACY | Facility: HOSPITAL | Age: 65
End: 2025-06-06
Payer: MEDICARE

## 2025-06-06 DIAGNOSIS — E66.01 MORBID OBESITY (MULTI): ICD-10-CM

## 2025-06-06 DIAGNOSIS — E11.65 TYPE 2 DIABETES MELLITUS WITH HYPERGLYCEMIA, WITHOUT LONG-TERM CURRENT USE OF INSULIN: Chronic | ICD-10-CM

## 2025-06-06 PROCEDURE — RXMED WILLOW AMBULATORY MEDICATION CHARGE

## 2025-06-06 RX ORDER — TIRZEPATIDE 7.5 MG/.5ML
7.5 INJECTION, SOLUTION SUBCUTANEOUS WEEKLY
Qty: 2 ML | Refills: 1 | Status: SHIPPED | OUTPATIENT
Start: 2025-06-06

## 2025-06-10 ENCOUNTER — PHARMACY VISIT (OUTPATIENT)
Dept: PHARMACY | Facility: CLINIC | Age: 65
End: 2025-06-10
Payer: MEDICARE

## 2025-06-13 ENCOUNTER — OFFICE VISIT (OUTPATIENT)
Dept: PRIMARY CARE | Facility: CLINIC | Age: 65
End: 2025-06-13
Payer: MEDICARE

## 2025-06-13 VITALS
HEART RATE: 60 BPM | SYSTOLIC BLOOD PRESSURE: 124 MMHG | OXYGEN SATURATION: 98 % | DIASTOLIC BLOOD PRESSURE: 82 MMHG | TEMPERATURE: 97.7 F

## 2025-06-13 DIAGNOSIS — J34.89 SINUS PRESSURE: ICD-10-CM

## 2025-06-13 DIAGNOSIS — H69.93 DYSFUNCTION OF BOTH EUSTACHIAN TUBES: ICD-10-CM

## 2025-06-13 DIAGNOSIS — J30.9 ALLERGIC RHINITIS, UNSPECIFIED SEASONALITY, UNSPECIFIED TRIGGER: Primary | ICD-10-CM

## 2025-06-13 PROCEDURE — 1159F MED LIST DOCD IN RCRD: CPT | Performed by: PHYSICIAN ASSISTANT

## 2025-06-13 PROCEDURE — 3074F SYST BP LT 130 MM HG: CPT | Performed by: PHYSICIAN ASSISTANT

## 2025-06-13 PROCEDURE — 1160F RVW MEDS BY RX/DR IN RCRD: CPT | Performed by: PHYSICIAN ASSISTANT

## 2025-06-13 PROCEDURE — 4010F ACE/ARB THERAPY RXD/TAKEN: CPT | Performed by: PHYSICIAN ASSISTANT

## 2025-06-13 PROCEDURE — 3079F DIAST BP 80-89 MM HG: CPT | Performed by: PHYSICIAN ASSISTANT

## 2025-06-13 PROCEDURE — 1036F TOBACCO NON-USER: CPT | Performed by: PHYSICIAN ASSISTANT

## 2025-06-13 PROCEDURE — 99213 OFFICE O/P EST LOW 20 MIN: CPT | Performed by: PHYSICIAN ASSISTANT

## 2025-06-13 ASSESSMENT — ENCOUNTER SYMPTOMS
SHORTNESS OF BREATH: 0
ABDOMINAL PAIN: 0

## 2025-06-13 ASSESSMENT — PATIENT HEALTH QUESTIONNAIRE - PHQ9
1. LITTLE INTEREST OR PLEASURE IN DOING THINGS: NOT AT ALL
SUM OF ALL RESPONSES TO PHQ9 QUESTIONS 1 AND 2: 0
2. FEELING DOWN, DEPRESSED OR HOPELESS: NOT AT ALL

## 2025-06-13 NOTE — PROGRESS NOTES
Subjective   Patient ID: Dinah Canales is a 65 y.o. female who presents for Itchy Eye and Sinus Problem (Sinus pressure, Pressure in head, behind eyes, x 2 days).    HPI     Patient c/o cough, nasal discharge or sinus pain. Denies fever, chills, aches, shortness of breath and sore throat.  Present for 2 days. Has worsened since onset.   Cough: Cough is mild. Sneezing a lot.   Nasal discharge: Described as clear.   Sinus pain: Experiencing frontal and facial pain.  Has ear pressure.   Denies associated diarrhea and vomiting.     Taking OTC Mucinex and flonase.      Patient denies recent known COVID exposure or international travel.        reports that she quit smoking about 18 years ago. Her smoking use included cigarettes. She has never used smokeless tobacco.    Review of Systems   Respiratory:  Negative for shortness of breath.    Cardiovascular:  Negative for chest pain.   Gastrointestinal:  Negative for abdominal pain.       Objective   /82   Pulse 60   Temp 36.5 °C (97.7 °F)   SpO2 98%     Physical Exam  Vitals and nursing note reviewed.   Constitutional:       General: She is not in acute distress.     Appearance: Normal appearance.   HENT:      Head: Normocephalic.      Right Ear: Ear canal and external ear normal.      Left Ear: Ear canal and external ear normal.      Ears:      Comments: Increased fluid behind TM's without erythema.        Nose: Rhinorrhea present.      Mouth/Throat:      Mouth: Mucous membranes are moist.      Pharynx: No oropharyngeal exudate or posterior oropharyngeal erythema.      Comments: Has PND.   Eyes:      General: No scleral icterus.  Cardiovascular:      Rate and Rhythm: Normal rate and regular rhythm.   Pulmonary:      Effort: Pulmonary effort is normal.      Breath sounds: Normal breath sounds. No wheezing, rhonchi or rales.   Lymphadenopathy:      Cervical: No cervical adenopathy.   Neurological:      Mental Status: She is alert.         Assessment/Plan   Diagnoses  and all orders for this visit:  Allergic rhinitis, unspecified seasonality, unspecified trigger  Dysfunction of both eustachian tubes  Sinus pressure       Use OTC Zyrtec 10mg every day.   Use Flonase consistently.   Add OTC Azelastine nasal spray prn.   Can use Mucinex prn.   Hydrate well.   Follow up if symptoms increase or persist.

## 2025-06-14 DIAGNOSIS — E78.5 HYPERLIPIDEMIA, UNSPECIFIED HYPERLIPIDEMIA TYPE: ICD-10-CM

## 2025-06-14 DIAGNOSIS — E03.9 HYPOTHYROIDISM, UNSPECIFIED TYPE: ICD-10-CM

## 2025-06-14 DIAGNOSIS — E11.65 TYPE 2 DIABETES MELLITUS WITH HYPERGLYCEMIA, WITHOUT LONG-TERM CURRENT USE OF INSULIN: Chronic | ICD-10-CM

## 2025-06-17 LAB
ALBUMIN SERPL-MCNC: 3.9 G/DL (ref 3.6–5.1)
ALP SERPL-CCNC: 75 U/L (ref 37–153)
ALT SERPL-CCNC: 13 U/L (ref 6–29)
ANION GAP SERPL CALCULATED.4IONS-SCNC: 7 MMOL/L (CALC) (ref 7–17)
AST SERPL-CCNC: 11 U/L (ref 10–35)
BILIRUB SERPL-MCNC: 0.3 MG/DL (ref 0.2–1.2)
BUN SERPL-MCNC: 18 MG/DL (ref 7–25)
CALCIUM SERPL-MCNC: 10 MG/DL (ref 8.6–10.4)
CHLORIDE SERPL-SCNC: 103 MMOL/L (ref 98–110)
CHOLEST SERPL-MCNC: 155 MG/DL
CHOLEST/HDLC SERPL: 3.7 (CALC)
CO2 SERPL-SCNC: 30 MMOL/L (ref 20–32)
CREAT SERPL-MCNC: 0.88 MG/DL (ref 0.5–1.05)
EGFRCR SERPLBLD CKD-EPI 2021: 72 ML/MIN/1.73M2
EST. AVERAGE GLUCOSE BLD GHB EST-MCNC: 126 MG/DL
EST. AVERAGE GLUCOSE BLD GHB EST-SCNC: 7 MMOL/L
GLUCOSE SERPL-MCNC: 91 MG/DL (ref 65–99)
HBA1C MFR BLD: 6 %
HDLC SERPL-MCNC: 42 MG/DL
LDLC SERPL CALC-MCNC: 87 MG/DL (CALC)
NONHDLC SERPL-MCNC: 113 MG/DL (CALC)
POTASSIUM SERPL-SCNC: 5.1 MMOL/L (ref 3.5–5.3)
PROT SERPL-MCNC: 6.7 G/DL (ref 6.1–8.1)
SODIUM SERPL-SCNC: 140 MMOL/L (ref 135–146)
TRIGL SERPL-MCNC: 159 MG/DL
TSH SERPL-ACNC: 0.37 MIU/L (ref 0.4–4.5)

## 2025-06-24 ENCOUNTER — APPOINTMENT (OUTPATIENT)
Dept: PRIMARY CARE | Facility: CLINIC | Age: 65
End: 2025-06-24
Payer: MEDICARE

## 2025-06-24 VITALS
HEART RATE: 68 BPM | TEMPERATURE: 98.3 F | SYSTOLIC BLOOD PRESSURE: 112 MMHG | WEIGHT: 235 LBS | OXYGEN SATURATION: 98 % | BODY MASS INDEX: 41.63 KG/M2 | DIASTOLIC BLOOD PRESSURE: 54 MMHG

## 2025-06-24 DIAGNOSIS — J30.9 ALLERGIC RHINITIS, UNSPECIFIED SEASONALITY, UNSPECIFIED TRIGGER: ICD-10-CM

## 2025-06-24 DIAGNOSIS — F33.1 DEPRESSION, MAJOR, RECURRENT, MODERATE: ICD-10-CM

## 2025-06-24 DIAGNOSIS — I10 BENIGN ESSENTIAL HYPERTENSION: ICD-10-CM

## 2025-06-24 DIAGNOSIS — E11.9 TYPE 2 DIABETES MELLITUS WITHOUT COMPLICATION, WITHOUT LONG-TERM CURRENT USE OF INSULIN: ICD-10-CM

## 2025-06-24 DIAGNOSIS — Z00.00 ROUTINE GENERAL MEDICAL EXAMINATION AT HEALTH CARE FACILITY: Primary | ICD-10-CM

## 2025-06-24 DIAGNOSIS — J01.00 ACUTE NON-RECURRENT MAXILLARY SINUSITIS: ICD-10-CM

## 2025-06-24 DIAGNOSIS — Z00.00 WELLNESS EXAMINATION: ICD-10-CM

## 2025-06-24 DIAGNOSIS — E78.5 HYPERLIPIDEMIA, UNSPECIFIED HYPERLIPIDEMIA TYPE: ICD-10-CM

## 2025-06-24 DIAGNOSIS — E03.9 HYPOTHYROIDISM, UNSPECIFIED TYPE: ICD-10-CM

## 2025-06-24 DIAGNOSIS — K59.03 DRUG INDUCED CONSTIPATION: ICD-10-CM

## 2025-06-24 PROCEDURE — 99214 OFFICE O/P EST MOD 30 MIN: CPT | Performed by: FAMILY MEDICINE

## 2025-06-24 PROCEDURE — 3078F DIAST BP <80 MM HG: CPT | Performed by: FAMILY MEDICINE

## 2025-06-24 PROCEDURE — 4010F ACE/ARB THERAPY RXD/TAKEN: CPT | Performed by: FAMILY MEDICINE

## 2025-06-24 PROCEDURE — 1170F FXNL STATUS ASSESSED: CPT | Performed by: FAMILY MEDICINE

## 2025-06-24 PROCEDURE — 99397 PER PM REEVAL EST PAT 65+ YR: CPT | Performed by: FAMILY MEDICINE

## 2025-06-24 PROCEDURE — 3074F SYST BP LT 130 MM HG: CPT | Performed by: FAMILY MEDICINE

## 2025-06-24 PROCEDURE — 1036F TOBACCO NON-USER: CPT | Performed by: FAMILY MEDICINE

## 2025-06-24 PROCEDURE — 1159F MED LIST DOCD IN RCRD: CPT | Performed by: FAMILY MEDICINE

## 2025-06-24 PROCEDURE — G0438 PPPS, INITIAL VISIT: HCPCS | Performed by: FAMILY MEDICINE

## 2025-06-24 RX ORDER — FLUTICASONE PROPIONATE 50 MCG
1 SPRAY, SUSPENSION (ML) NASAL DAILY
Qty: 16 G | Refills: 5 | Status: SHIPPED | OUTPATIENT
Start: 2025-06-24 | End: 2026-06-24

## 2025-06-24 RX ORDER — METFORMIN HYDROCHLORIDE 1000 MG/1
1000 TABLET ORAL 2 TIMES DAILY
Qty: 180 TABLET | Refills: 1 | Status: SHIPPED | OUTPATIENT
Start: 2025-06-24

## 2025-06-24 RX ORDER — ATORVASTATIN CALCIUM 20 MG/1
20 TABLET, FILM COATED ORAL DAILY
Qty: 90 TABLET | Refills: 1 | Status: SHIPPED | OUTPATIENT
Start: 2025-06-24

## 2025-06-24 RX ORDER — BUPROPION HYDROCHLORIDE 150 MG/1
150 TABLET ORAL DAILY
Qty: 90 TABLET | Refills: 1 | Status: SHIPPED | OUTPATIENT
Start: 2025-06-24

## 2025-06-24 RX ORDER — LEVOTHYROXINE SODIUM 112 UG/1
112 TABLET ORAL DAILY
Qty: 90 TABLET | Refills: 1 | Status: SHIPPED | OUTPATIENT
Start: 2025-06-24

## 2025-06-24 RX ORDER — DOXYCYCLINE 100 MG/1
100 CAPSULE ORAL 2 TIMES DAILY
Qty: 14 CAPSULE | Refills: 0 | Status: SHIPPED | OUTPATIENT
Start: 2025-06-24 | End: 2025-07-01

## 2025-06-24 RX ORDER — TIRZEPATIDE 7.5 MG/.5ML
7.5 INJECTION, SOLUTION SUBCUTANEOUS WEEKLY
Qty: 2 ML | Refills: 1 | Status: SHIPPED | OUTPATIENT
Start: 2025-06-24 | End: 2025-06-25 | Stop reason: SDUPTHER

## 2025-06-24 RX ORDER — BLOOD-GLUCOSE SENSOR
EACH MISCELLANEOUS
Qty: 5 EACH | Refills: 3 | Status: SHIPPED | OUTPATIENT
Start: 2025-06-24

## 2025-06-24 RX ORDER — LISINOPRIL 40 MG/1
40 TABLET ORAL DAILY
Qty: 90 TABLET | Refills: 1 | Status: SHIPPED | OUTPATIENT
Start: 2025-06-24

## 2025-06-24 ASSESSMENT — ENCOUNTER SYMPTOMS
DYSURIA: 0
VOMITING: 0
APPETITE CHANGE: 0
FATIGUE: 0
SHORTNESS OF BREATH: 0
FEVER: 0
SORE THROAT: 0
RHINORRHEA: 0
CHILLS: 0
NAUSEA: 0
TROUBLE SWALLOWING: 0
DIARRHEA: 0
DIZZINESS: 0
SINUS PAIN: 1
HEADACHES: 0
SINUS PRESSURE: 1
FREQUENCY: 0
CONSTIPATION: 0
PALPITATIONS: 0
ABDOMINAL PAIN: 0
COUGH: 0

## 2025-06-24 ASSESSMENT — ACTIVITIES OF DAILY LIVING (ADL)
DOING_HOUSEWORK: INDEPENDENT
TAKING_MEDICATION: INDEPENDENT
MANAGING_FINANCES: INDEPENDENT
BATHING: INDEPENDENT
GROCERY_SHOPPING: NEEDS ASSISTANCE
DRESSING: INDEPENDENT

## 2025-06-24 NOTE — ASSESSMENT & PLAN NOTE
Orders:    metFORMIN (Glucophage) 1,000 mg tablet; Take 1 tablet (1,000 mg) by mouth 2 times a day.    tirzepatide (Mounjaro) 7.5 mg/0.5 mL pen injector; Inject 7.5 mg under the skin 1 (one) time per week.    Albumin-Creatinine Ratio, Urine Random; Future    Hemoglobin A1C; Future    CBC and Auto Differential; Future    Comprehensive Metabolic Panel; Future    Dexcom G7 Sensor device; Use as directed to check blood sugar daily.

## 2025-06-24 NOTE — PROGRESS NOTES
Subjective   Reason for Visit: Dinah Canaels is an 65 y.o. female here for a Medicare Wellness visit.     Past Medical, Surgical, and Family History reviewed and updated in chart.    Reviewed all medications by prescribing practitioner or clinical pharmacist (such as prescriptions, OTCs, herbal therapies and supplements) and documented in the medical record.    Dinah has been feeling well.  She continues to successfully lose weight on the Mounjaro.  She is feeling better than she has felt in years.  Her only new concern is a sinus infection.  She has been sick for greater than 1 week.  Is experiencing pressure in the maxillary sinuses.  Is congested and having some ear pain.  Has not had any fevers or chills.    She has been doing well on her current dose of Mounjaro.  She has been experiencing some constipation so has needed to take MiraLAX daily to support normal stooling.    She saw that her thyroid was abnormal so she stopped taking her second tablet on Saturdays.        Patient Care Team:  Cecelia La DO as PCP - General  Cecelia La DO as PCP - [employer] PCP  Andrae FernandezD as Pharmacist (Pharmacy)     Review of Systems   Constitutional:  Negative for appetite change, chills, fatigue and fever.   HENT:  Positive for congestion, sinus pressure and sinus pain. Negative for rhinorrhea, sore throat and trouble swallowing.    Eyes:  Negative for visual disturbance.   Respiratory:  Negative for cough and shortness of breath.    Cardiovascular:  Negative for chest pain and palpitations.   Gastrointestinal:  Negative for abdominal pain, constipation, diarrhea, nausea and vomiting.   Genitourinary:  Negative for dysuria and frequency.   Neurological:  Negative for dizziness and headaches.       Objective   Vitals:  /54   Pulse 68   Temp 36.8 °C (98.3 °F)   Wt 107 kg (235 lb)   SpO2 98%   BMI 41.63 kg/m²       Physical Exam  Constitutional:       General: She is  not in acute distress.     Appearance: She is not toxic-appearing.   HENT:      Head: Normocephalic and atraumatic.      Right Ear: Tympanic membrane normal.      Left Ear: Tympanic membrane normal.      Nose: Congestion and rhinorrhea present.      Mouth/Throat:      Mouth: Mucous membranes are moist.      Pharynx: No oropharyngeal exudate or posterior oropharyngeal erythema.   Eyes:      Extraocular Movements: Extraocular movements intact.   Cardiovascular:      Rate and Rhythm: Normal rate and regular rhythm.      Heart sounds: No murmur heard.  Pulmonary:      Effort: Pulmonary effort is normal. No respiratory distress.      Breath sounds: No wheezing, rhonchi or rales.   Abdominal:      General: There is no distension.      Palpations: Abdomen is soft.      Tenderness: There is no abdominal tenderness. There is no guarding or rebound.   Musculoskeletal:         General: No swelling or tenderness.      Cervical back: Neck supple.      Right lower leg: No edema.      Left lower leg: No edema.   Lymphadenopathy:      Cervical: No cervical adenopathy.   Skin:     General: Skin is warm and dry.   Neurological:      Mental Status: She is alert.      Cranial Nerves: No cranial nerve deficit.      Motor: No weakness.      Coordination: Coordination normal.      Gait: Gait normal.   Psychiatric:         Mood and Affect: Mood normal.         Behavior: Behavior normal.         Assessment & Plan  Routine general medical examination at health care facility  Medicare AWE performed.   Orders:    1 Year Follow Up In Primary Care - Wellness Exam; Future    Wellness examination  CPE performed. Recommend Shingrix vaccine.        Acute non-recurrent maxillary sinusitis  Start doxycycline. Continue Flonase.   Orders:    doxycycline (Vibramycin) 100 mg capsule; Take 1 capsule (100 mg) by mouth 2 times a day for 7 days. Take with at least 8 ounces (large glass) of water, do not lie down for 30 minutes after    Type 2 diabetes  mellitus without complication, without long-term current use of insulin  Hemoglobin A1c improved to 6.0%.  Patient losing weight successfully with Mounjaro.  Discussed increasing Mounjaro further to 10 mg, but patient would like to see if she can lose more weight at her current dose of medication.  She does have an appointment to follow-up with the pharmacist in the next month.  Orders:    metFORMIN (Glucophage) 1,000 mg tablet; Take 1 tablet (1,000 mg) by mouth 2 times a day.    tirzepatide (Mounjaro) 7.5 mg/0.5 mL pen injector; Inject 7.5 mg under the skin 1 (one) time per week.    Albumin-Creatinine Ratio, Urine Random; Future    Hemoglobin A1C; Future    CBC and Auto Differential; Future    Comprehensive Metabolic Panel; Future    Dexcom G7 Sensor device; Use as directed to check blood sugar daily.    Benign essential hypertension  Controlled.  Continue lisinopril.  Orders:    lisinopril 40 mg tablet; Take 1 tablet (40 mg) by mouth once daily.    Hyperlipidemia, unspecified hyperlipidemia type  LDL at goal.  Continue atorvastatin 20 mg.  Orders:    atorvastatin (Lipitor) 20 mg tablet; Take 1 tablet (20 mg) by mouth once daily.    Lipid Panel; Future    Hypothyroidism, unspecified type  TSH suppressed.  Patient has been taking levothyroxine 1 tablet daily except 2 tablets on Sundays.  Recommend she now switch to 1 tablet daily.  Will recheck TSH in 6 weeks.  Orders:    levothyroxine (Synthroid, Levoxyl) 112 mcg tablet; Take 1 tablet (112 mcg) by mouth early in the morning..    Thyroid Stimulating Hormone; Future    Depression, major, recurrent, moderate  Stable.  Continue bupropion.  Orders:    buPROPion XL (Wellbutrin XL) 150 mg 24 hr tablet; Take 1 tablet (150 mg) by mouth once daily.    Allergic rhinitis, unspecified seasonality, unspecified trigger  Continue Flonase.  Orders:    fluticasone (Flonase) 50 mcg/actuation nasal spray; Administer 1 spray into each nostril once daily. Shake gently. Before first use,  prime pump. After use, clean tip and replace cap.    Drug induced constipation  Discussed increasing fiber in the diet.  Continue to use MiraLAX as needed.

## 2025-06-24 NOTE — ASSESSMENT & PLAN NOTE
Hemoglobin A1c improved to 6.0%.  Patient losing weight successfully with Mounjaro.  Discussed increasing Mounjaro further to 10 mg, but patient would like to see if she can lose more weight at her current dose of medication.  She does have an appointment to follow-up with the pharmacist in the next month.  Orders:    metFORMIN (Glucophage) 1,000 mg tablet; Take 1 tablet (1,000 mg) by mouth 2 times a day.    tirzepatide (Mounjaro) 7.5 mg/0.5 mL pen injector; Inject 7.5 mg under the skin 1 (one) time per week.    Albumin-Creatinine Ratio, Urine Random; Future    Hemoglobin A1C; Future    CBC and Auto Differential; Future    Comprehensive Metabolic Panel; Future    Dexcom G7 Sensor device; Use as directed to check blood sugar daily.

## 2025-06-24 NOTE — ASSESSMENT & PLAN NOTE
Continue Flonase.  Orders:    fluticasone (Flonase) 50 mcg/actuation nasal spray; Administer 1 spray into each nostril once daily. Shake gently. Before first use, prime pump. After use, clean tip and replace cap.

## 2025-06-24 NOTE — ASSESSMENT & PLAN NOTE
Stable.  Continue bupropion.  Orders:    buPROPion XL (Wellbutrin XL) 150 mg 24 hr tablet; Take 1 tablet (150 mg) by mouth once daily.

## 2025-06-24 NOTE — ASSESSMENT & PLAN NOTE
Controlled.  Continue lisinopril.  Orders:    lisinopril 40 mg tablet; Take 1 tablet (40 mg) by mouth once daily.

## 2025-06-24 NOTE — PATIENT INSTRUCTIONS
Please go to the lab in 6 weeks (8/5/25) to recheck your thyroid levels. You do not need to fast.     When you go to the lab in 6 months, please be prepared to give a urine sample.

## 2025-06-24 NOTE — ASSESSMENT & PLAN NOTE
TSH suppressed.  Patient has been taking levothyroxine 1 tablet daily except 2 tablets on Sundays.  Recommend she now switch to 1 tablet daily.  Will recheck TSH in 6 weeks.  Orders:    levothyroxine (Synthroid, Levoxyl) 112 mcg tablet; Take 1 tablet (112 mcg) by mouth early in the morning..    Thyroid Stimulating Hormone; Future

## 2025-06-24 NOTE — ASSESSMENT & PLAN NOTE
LDL at goal.  Continue atorvastatin 20 mg.  Orders:    atorvastatin (Lipitor) 20 mg tablet; Take 1 tablet (20 mg) by mouth once daily.    Lipid Panel; Future

## 2025-06-25 ENCOUNTER — TELEPHONE (OUTPATIENT)
Dept: PRIMARY CARE | Facility: CLINIC | Age: 65
End: 2025-06-25
Payer: MEDICARE

## 2025-06-25 DIAGNOSIS — E11.9 TYPE 2 DIABETES MELLITUS WITHOUT COMPLICATION, WITHOUT LONG-TERM CURRENT USE OF INSULIN: ICD-10-CM

## 2025-06-25 RX ORDER — TIRZEPATIDE 7.5 MG/.5ML
7.5 INJECTION, SOLUTION SUBCUTANEOUS WEEKLY
Qty: 2 ML | Refills: 2 | Status: SHIPPED | OUTPATIENT
Start: 2025-06-25

## 2025-06-25 RX ORDER — TIRZEPATIDE 7.5 MG/.5ML
7.5 INJECTION, SOLUTION SUBCUTANEOUS WEEKLY
Qty: 2 ML | Refills: 4 | Status: SHIPPED | OUTPATIENT
Start: 2025-06-25 | End: 2025-06-25 | Stop reason: SDUPTHER

## 2025-06-25 NOTE — TELEPHONE ENCOUNTER
Medicine was sent to wrong pharmacy needs to go to the  Pharmacy in [place]. She gets it free there tirzepatide (Mounjaro) 7.5 mg/0.5 mL pen injector

## 2025-07-04 PROCEDURE — RXMED WILLOW AMBULATORY MEDICATION CHARGE

## 2025-07-08 ENCOUNTER — APPOINTMENT (OUTPATIENT)
Dept: PHARMACY | Facility: HOSPITAL | Age: 65
End: 2025-07-08
Payer: MEDICARE

## 2025-07-08 ENCOUNTER — PHARMACY VISIT (OUTPATIENT)
Dept: PHARMACY | Facility: CLINIC | Age: 65
End: 2025-07-08
Payer: MEDICARE

## 2025-07-09 ENCOUNTER — TELEMEDICINE (OUTPATIENT)
Dept: PHARMACY | Facility: HOSPITAL | Age: 65
End: 2025-07-09
Payer: MEDICARE

## 2025-07-09 DIAGNOSIS — E11.65 TYPE 2 DIABETES MELLITUS WITH HYPERGLYCEMIA, WITHOUT LONG-TERM CURRENT USE OF INSULIN: Chronic | ICD-10-CM

## 2025-07-09 DIAGNOSIS — E66.01 MORBID OBESITY (MULTI): ICD-10-CM

## 2025-07-09 NOTE — PROGRESS NOTES
Clinical Pharmacy Appointment    Patient ID: Dinah Canales is a 65 y.o. female who presents for Diabetes.    Pt is here for Follow Up appointment.     Referring Provider: Cecelia aL, *  PCP: Cecelia La DO  Last visit with PCP: 6/24/25   Next visit with PCP: 12/23/25     Patient Assistance for Mounjaro approved through 8/2/2025. Will have to be renewed prior to that date to prevent lapse in coverage. Medication(s) will be received at no cost to patient from [facility].       Subjective   HPI     Current Diabetes Pharmacotherapy:    - Metformin 1000 mg QAM   - Mounjaro 5 mg weekly - Saturday     Social History:  Current diet:   - 2 meals a day  - Does not eat that much   - Does not eat sweets  - Fruits  - B: Scrambled eggs with veggies  - D: Chicken, fish, steak   - Lost 30 lbs on Trulicity  - Does not eat past 6 pm  - Does not like potatoes  - Feeling hungry more this month      Current exercise:   - Runs around taking care of 13 grandchildren   - Usually doing a lot of movement at night; vacuum, cleaning, putting toys away     Weight:  - Baseline weight: 250 lbs  - Weight last appt: 238 lbs  - Current weight: 231 lbs     Eye exam for this year?: no - September 2024  Foot exam for this year?: no      Current monitoring regimen:   Patient is using: continuous glucose monitor  Type of CGM: Dexcom G7 - phone     Any episodes of hypoglycemia? No     Adverse Effects: Constipation has gotten better              - Miralax/juice helps      Objective   Allergies[1]  Social History     Social History Narrative    Not on file      Medication Review  Current Outpatient Medications   Medication Instructions    albuterol 90 mcg/actuation inhaler 2 puffs, inhalation, Every 4 hours PRN    atorvastatin (LIPITOR) 20 mg, oral, Daily    azelastine (Optivar) 0.05 % ophthalmic solution 1 drop both eyes 2 times a day as needed for itching/allergies    buPROPion XL (WELLBUTRIN XL) 150 mg, oral, Daily     "cyanocobalamin, vitamin B-12, (Vitamin B-12) 1,000 mcg tablet extended release 1 tablet, Daily    Dexcom G7 Sensor device Use as directed to check blood sugar daily.    fluticasone (Flonase) 50 mcg/actuation nasal spray 1 spray, Each Nostril, Daily, Shake gently. Before first use, prime pump. After use, clean tip and replace cap.    furosemide (LASIX) 20 mg, Daily PRN    levothyroxine (SYNTHROID, LEVOXYL) 112 mcg, oral, Daily    lisinopril 40 mg, oral, Daily    metFORMIN (GLUCOPHAGE) 1,000 mg, oral, 2 times daily    Mounjaro 7.5 mg, subcutaneous, Weekly      Vitals  BP Readings from Last 2 Encounters:   06/24/25 112/54   06/13/25 124/82     BMI Readings from Last 1 Encounters:   06/24/25 41.63 kg/m²      Labs  A1C  Lab Results   Component Value Date    HGBA1C 6.0 (H) 06/16/2025    HGBA1C 6.5 (H) 02/17/2025    HGBA1C 6.5 (H) 08/20/2024     BMP  Lab Results   Component Value Date    CALCIUM 10.0 06/16/2025     06/16/2025    K 5.1 06/16/2025    CO2 30 06/16/2025     06/16/2025    BUN 18 06/16/2025    CREATININE 0.88 06/16/2025    EGFR 72 06/16/2025     LFTs  Lab Results   Component Value Date    ALT 13 06/16/2025    AST 11 06/16/2025    ALKPHOS 75 06/16/2025    BILITOT 0.3 06/16/2025     FLP  Lab Results   Component Value Date    TRIG 159 (H) 06/16/2025    CHOL 155 06/16/2025    LDLF 115 (H) 01/24/2023    LDLCALC 87 06/16/2025    HDL 42 (L) 06/16/2025     Urine Microalbumin  No results found for: \"MICROALBCREA\"  Weight Management  Wt Readings from Last 3 Encounters:   06/24/25 107 kg (235 lb)   03/26/25 113 kg (250 lb)   02/24/25 113 kg (250 lb)      There is no height or weight on file to calculate BMI.     Assessment/Plan   Problem List Items Addressed This Visit       Morbid obesity (Multi)    Relevant Orders    Referral to Clinical Pharmacy    Type 2 diabetes mellitus (Chronic)    Relevant Orders    Referral to Clinical Pharmacy     ASSESSMENT  Patient's diabetes has improved and his very controlled " with recent A1c of 6.0% (previously at 6.5%) with goal of <7%.    Patient has lost 6 lb in the past month and continues to tolerate Mounjaro 5 mg well. Patient continues to take Miralax to help with constipation. Patient did have 1 hypoglycemic episode where BG dropped to 52, likely due to not eating enough during the day while on vacation. Patient appropriately corrected with a spoonful of peanut butter and BG levels paramjit. Suggested placing glucose tablets at bedside.    PLAN  CONTINUE Mounjaro 5 mg once weekly and metformin 1000 mg once daily    Clinical Pharmacist follow-up: 8/7   Continue all meds under the continuation of care with the referring provider and clinical pharmacy team.           [1]   Allergies  Allergen Reactions    Penicillins Other, Hives and Palpitations     Anaphylactic reaction    Ciprofloxacin Other     Severe  shoulder pain

## 2025-07-29 PROCEDURE — RXMED WILLOW AMBULATORY MEDICATION CHARGE

## 2025-07-31 ENCOUNTER — PHARMACY VISIT (OUTPATIENT)
Dept: PHARMACY | Facility: CLINIC | Age: 65
End: 2025-07-31
Payer: MEDICARE

## 2025-08-01 NOTE — PROGRESS NOTES
Pharmacist Clinic: Diabetes Management  Dinah Canales is a 65 y.o. female was referred to Clinical Pharmacy Team for diabetes management.   Referring Provider: Cecelia La, *  - Last visit with referring provider: 6/24/25     Patient Assistance for Mounjaro approved through 7/28/26. Will have to be renewed prior to that date to prevent lapse in coverage. Medication(s) will be received at no cost to patient from [facility].     Subjective     HPI    Current Diabetes Pharmacotherapy:    - Metformin 1000 mg QAM   - Mounjaro 7.5 mg weekly - Saturday     Social History:  Current diet:   - 2 meals a day  - Does not eat that much   - Does not eat sweets  - Fruits  - B: Scrambled eggs with veggies  - D: Chicken, fish, steak   - Lost 30 lbs on Trulicity  - Does not eat past 6 pm  - Does not like potatoes  - Feeling hungry more this month     Current exercise:   - Runs around taking care of 13 grandchildren   - Usually doing a lot of movement at night; vacuum, cleaning, putting toys away    Weight:  - Baseline weight: 250 lbs  - Weight last appt: 229 lbs  - Current weight:  224 lbs    Eye exam for this year?: no - September 2024  Foot exam for this year?: no     Current monitoring regimen:   Patient is using: continuous glucose monitor  Type of CGM: Dexcom G7 - phone    Reported blood sugars:   See APG report below   - Stress lately with daughter     Any episodes of hypoglycemia? No    Adverse Effects: a little nausea after injection day but tolerable               Objective     There were no vitals taken for this visit.    Allergies   Allergen Reactions    Penicillins Other, Hives and Palpitations     Anaphylactic reaction    Ciprofloxacin Other     Severe  shoulder pain       Historical Diabetes Pharmacotherapy:  - Trulicity (costly)  - Jardiance (yeast infections)  - Farxiga (costly but tolerated)     SECONDARY PREVENTION  - Statin? Yes   - ACE-I/ARB? Yes  - Aspirin? No    Pertinent PMH Review:  - [facility]  "of Pancreatitis: No  - PMH of Retinopathy: No  - PMH of Urinary Tract Infections: No  - PMH of Yeast Infections: No  - PMH of MTC: No    Lab Review  Lab Results   Component Value Date    BILITOT 0.3 06/16/2025    CALCIUM 10.0 06/16/2025    CO2 30 06/16/2025     06/16/2025    CREATININE 0.88 06/16/2025    GLUCOSE 91 06/16/2025    ALKPHOS 75 06/16/2025    K 5.1 06/16/2025    PROT 6.7 06/16/2025     06/16/2025    AST 11 06/16/2025    ALT 13 06/16/2025    BUN 18 06/16/2025    ANIONGAP 7 06/16/2025    ALBUMIN 3.9 06/16/2025    GFRF 72 01/24/2023     Lab Results   Component Value Date    EGFR 72 06/16/2025      No results found for: \"MICROALBCREA\"    Lab Results   Component Value Date    TRIG 159 (H) 06/16/2025    CHOL 155 06/16/2025    HDL 42 (L) 06/16/2025     Lab Results   Component Value Date    HGBA1C 6.0 (H) 06/16/2025    HGBA1C 6.5 (H) 02/17/2025    HGBA1C 6.5 (H) 08/20/2024     The 10-year ASCVD risk score (Andria MC, et al., 2019) is: 11.8%    Values used to calculate the score:      Age: 65 years      Clincally relevant sex: Female      Is Non- : No      Diabetic: Yes      Tobacco smoker: No      Systolic Blood Pressure: 112 mmHg      Is BP treated: Yes      HDL Cholesterol: 42 mg/dL      Total Cholesterol: 155 mg/dL    Drug Interactions:  - None    Affordability/Accessibility:  - PAP for Trulicity    Preferred Pharmacy:  - [facility]    Assessment/Plan   Problem List Items Addressed This Visit       Morbid obesity (Multi)    Relevant Orders    Referral to Clinical Pharmacy    Type 2 diabetes mellitus - Primary (Chronic)    Relevant Orders    Referral to Clinical Pharmacy       ASSESSMENT:  Patients diabetes is controlled with most recent A1c of 6%.     Patient is doing well on current regimen. Report low alarms during night time without any symptoms. Patient does confirm no low with FS. Informed patient that sleeping on it wrong can result in false readings. She has lost " additional 5 lbs and would like to stay on the same regimen. Follow up in 4 wks    PLAN:  INCREASE Mounjaro to 7.5 mg weekly  Follow up with clinical pharmacist: 7/8/25 @ 9:20   Follow up with PCP: 6/24/26    Thank you,  Mary Collins, [facility]  Clinical Pharmacy Specialist  P: [phone]   Email: [email or web address]     Continue all meds under the continuation of care with the referring provider and clinical pharmacy team.

## 2025-08-05 DIAGNOSIS — E03.9 HYPOTHYROIDISM, UNSPECIFIED TYPE: ICD-10-CM

## 2025-08-05 LAB — TSH SERPL-ACNC: 0.65 MIU/L (ref 0.4–4.5)

## 2025-08-06 RX ORDER — LEVOTHYROXINE SODIUM 112 UG/1
112 TABLET ORAL DAILY
Qty: 90 TABLET | Refills: 1 | Status: SHIPPED | OUTPATIENT
Start: 2025-08-06

## 2025-08-07 ENCOUNTER — APPOINTMENT (OUTPATIENT)
Dept: PHARMACY | Facility: HOSPITAL | Age: 65
End: 2025-08-07
Payer: MEDICARE

## 2025-08-07 DIAGNOSIS — E66.01 MORBID OBESITY (MULTI): ICD-10-CM

## 2025-08-07 DIAGNOSIS — E11.65 TYPE 2 DIABETES MELLITUS WITH HYPERGLYCEMIA, WITHOUT LONG-TERM CURRENT USE OF INSULIN: Primary | ICD-10-CM

## 2025-08-26 PROCEDURE — RXMED WILLOW AMBULATORY MEDICATION CHARGE

## 2025-08-27 ENCOUNTER — PHARMACY VISIT (OUTPATIENT)
Dept: PHARMACY | Facility: CLINIC | Age: 65
End: 2025-08-27
Payer: MEDICARE

## 2025-09-04 ENCOUNTER — APPOINTMENT (OUTPATIENT)
Dept: PHARMACY | Facility: HOSPITAL | Age: 65
End: 2025-09-04
Payer: MEDICARE

## 2025-10-15 ENCOUNTER — APPOINTMENT (OUTPATIENT)
Dept: OPHTHALMOLOGY | Age: 65
End: 2025-10-15
Payer: MEDICARE

## 2025-10-30 ENCOUNTER — APPOINTMENT (OUTPATIENT)
Dept: PHARMACY | Facility: HOSPITAL | Age: 65
End: 2025-10-30
Payer: MEDICARE

## 2025-12-23 ENCOUNTER — APPOINTMENT (OUTPATIENT)
Dept: PRIMARY CARE | Facility: CLINIC | Age: 65
End: 2025-12-23
Payer: MEDICARE

## 2026-04-17 ENCOUNTER — APPOINTMENT (OUTPATIENT)
Dept: OPHTHALMOLOGY | Facility: CLINIC | Age: 66
End: 2026-04-17
Payer: MEDICARE

## 2026-06-24 ENCOUNTER — APPOINTMENT (OUTPATIENT)
Dept: PRIMARY CARE | Facility: CLINIC | Age: 66
End: 2026-06-24
Payer: MEDICARE